# Patient Record
Sex: FEMALE | Race: WHITE
[De-identification: names, ages, dates, MRNs, and addresses within clinical notes are randomized per-mention and may not be internally consistent; named-entity substitution may affect disease eponyms.]

---

## 2017-01-13 ENCOUNTER — HOSPITAL ENCOUNTER (OUTPATIENT)
Dept: HOSPITAL 47 - ORWHC2ENDO | Age: 57
Discharge: HOME | End: 2017-01-13
Attending: SURGERY
Payer: MEDICARE

## 2017-01-13 VITALS — RESPIRATION RATE: 20 BRPM | SYSTOLIC BLOOD PRESSURE: 126 MMHG | DIASTOLIC BLOOD PRESSURE: 84 MMHG | HEART RATE: 84 BPM

## 2017-01-13 VITALS — TEMPERATURE: 98 F

## 2017-01-13 VITALS — BODY MASS INDEX: 40.2 KG/M2

## 2017-01-13 DIAGNOSIS — E66.01: ICD-10-CM

## 2017-01-13 DIAGNOSIS — F41.9: ICD-10-CM

## 2017-01-13 DIAGNOSIS — F31.30: ICD-10-CM

## 2017-01-13 DIAGNOSIS — Z79.899: ICD-10-CM

## 2017-01-13 DIAGNOSIS — Z88.1: ICD-10-CM

## 2017-01-13 DIAGNOSIS — Z88.8: ICD-10-CM

## 2017-01-13 DIAGNOSIS — Z91.041: ICD-10-CM

## 2017-01-13 DIAGNOSIS — K21.9: ICD-10-CM

## 2017-01-13 DIAGNOSIS — K29.50: Primary | ICD-10-CM

## 2017-01-13 PROCEDURE — 88342 IMHCHEM/IMCYTCHM 1ST ANTB: CPT

## 2017-01-13 PROCEDURE — 99153 MOD SED SAME PHYS/QHP EA: CPT

## 2017-01-13 PROCEDURE — 43239 EGD BIOPSY SINGLE/MULTIPLE: CPT

## 2017-01-13 PROCEDURE — 88305 TISSUE EXAM BY PATHOLOGIST: CPT

## 2017-01-13 NOTE — P.GSHP
History of Present Illness


H&P Date: 01/13/17


Chief Complaint: GERD, morbid obesity





This a 56-year-old female who presents today for EGD.  Patient is undergoing 

workup for sleeve gastrectomy.  She has a history of GERD and morbid obesity.  

Her BMI is 40.





Past Medical History


Past Medical History: Asthma, Fibromyalgia, GERD/Reflux, Musculoskeletal 

Disorder, Osteoarthritis (OA), Skin Disorder, Thyroid Disorder


Additional Past Medical History / Comment(s): IBS, migraines,  respiratory 

infection - on antibiotics, hiatal hernia,  hx gout,  rosacea,


History of Any Multi-Drug Resistant Organisms: None Reported


Past Surgical History: Adenoidectomy, Appendectomy, Bariatric Surgery, 

Cholecystectomy, Heart Catheterization, Orthopedic Surgery, Tonsillectomy, 

Tubal Ligation, Uterine Ablation


Additional Past Surgical History / Comment(s): tendon/ligament repair in foot, 

fredo knee arthroscopy, lipoma out of rt. shoulder, lap band/later  removed, 

thyroidectomy, rt hand surgery,


Past Anesthesia/Blood Transfusion Reactions: Motion Sickness


Past Psychological History: Anxiety, Bipolar, Depression


Additional Psychological History / Comment(s): Major depressive disorder,  

suicide attempts x 4,


Smoking Status: Never smoker


Past Alcohol Use History: None Reported


Past Drug Use History: None Reported





- Past Family History


  ** Mother


Family Medical History: Cancer


Additional Family Medical History / Comment(s): Pancreatic





  ** Father


Family Medical History: Cancer, Deep Vein Thrombosis (DVT), Pulmonary Embolus


Additional Family Medical History / Comment(s): Prostate





Medications and Allergies


 Home Medications











 Medication  Instructions  Recorded  Confirmed  Type


 


Lansoprazole [Prevacid] 30 mg PO DAILY 06/05/16 01/13/17 History


 


Levothyroxine Sodium [Synthroid] 25 mcg PO DAILY 06/05/16 01/13/17 History


 


Sertraline [Zoloft] 100 mg PO HS 06/05/16 01/13/17 History


 


carBAMazepine [TEGretol] 200 mg PO HS 06/05/16 01/13/17 History


 


traZODone HCL [Desyrel] 200 mg PO HS 06/05/16 01/13/17 History


 


Baclofen [Lioresal] 5 mg PO BID PRN 07/07/16 01/13/17 History


 


Calcium Tablet 1,000 mg PO QAM 08/08/16 01/13/17 History


 


Multivitamins, Thera [Multivitamin] 2 tab PO DAILY 08/08/16 01/13/17 History


 


Temazepam [Restoril] 30 mg PO HS PRN 08/08/16 01/13/17 History


 


traMADol HCL [Ultram] 50 mg PO Q8HR PRN 08/08/16 01/13/17 History


 


Ibuprofen [Motrin] 800 mg PO Q8HR PRN 10/20/16 01/13/17 History


 


Albuterol Inhaler [Ventolin Hfa 2 puff INHALATION Q6HR PRN 01/10/17 01/13/17 

History





Inhaler]    


 


Doxycycline Monohydrate [Monodox] 100 mg PO Q12HR 01/10/17 01/13/17 History


 


Loratadine [Claritin] 10 mg PO DAILY PRN 01/10/17 01/13/17 History











 Allergies











Allergy/AdvReac Type Severity Reaction Status Date / Time


 


desvenlafaxine succinate Allergy  SORES IN Verified 01/13/17 09:09





[From Pristiq]   MOUTH  


 


cefuroxime axetil AdvReac  Nausea & Verified 01/13/17 09:09





[From Ceftin]   Vomiting  


 


etodolac [From Lodine] AdvReac  HEART BURN Verified 01/13/17 09:09


 


nitrofurantoin AdvReac  Nausea & Verified 01/13/17 09:09





macrocrystalline   Vomiting  





[From Macrodantin]     














Surgical - Exam


 Vital Signs











Temp Pulse Resp BP Pulse Ox


 


 98 F   81   16   117/59   98 


 


 01/13/17 09:15  01/13/17 09:15  01/13/17 09:15  01/13/17 09:15  01/13/17 09:15














- General


well developed, no distress





- Eyes


PERRL





- ENT


normal pinna





- Neck


no masses, no bruits





- Respiratory


normal expansion





- Cardiovascular


Rhythm: regular





- Abdomen


Abdomen: soft, non tender





Assessment and Plan


Plan: 











Morbid obesity





We'll perform EGD

## 2017-01-13 NOTE — P.OP
Date of Procedure: 01/13/17


Preoperative Diagnosis: 


GERD





Morbid obesity


Postoperative Diagnosis: 


Mild antral gastritis


Procedure(s) Performed: 


EGD


Anesthesia: MAC


Surgeon: Danish Townsend


Pathology: other (Antrum)


Condition: stable


Disposition: PACU


Description of Procedure: 


The patient's placed on the endoscopy table at the lateral position.  The 

gastric scope some placed oropharynx and passed in the esophagus and into the 

stomach.  The scope was then placed through the pylorus.  The first and second 

portion of the duodenum appeared normal.  The scope was then brought back the 

antrum this.  Mildly inflamed.  A biopsies performed.  Scope was unretroflexed 

and remainder of the stomach appeared normal.  There was no significant hiatal 

hernia.  The GE junction was at 47.  The distal esophagus appeared normal.  The 

proximal esophagus.  Normal.  Scope was withdrawn for patient.

## 2017-02-10 ENCOUNTER — HOSPITAL ENCOUNTER (INPATIENT)
Dept: HOSPITAL 47 - 2ORWHC | Age: 57
LOS: 4 days | Discharge: HOME | DRG: 620 | End: 2017-02-14
Attending: SURGERY | Admitting: SURGERY
Payer: MEDICARE

## 2017-02-10 VITALS — BODY MASS INDEX: 38.9 KG/M2

## 2017-02-10 DIAGNOSIS — M79.7: ICD-10-CM

## 2017-02-10 DIAGNOSIS — K92.0: ICD-10-CM

## 2017-02-10 DIAGNOSIS — G40.909: ICD-10-CM

## 2017-02-10 DIAGNOSIS — E66.01: Primary | ICD-10-CM

## 2017-02-10 DIAGNOSIS — J45.909: ICD-10-CM

## 2017-02-10 DIAGNOSIS — M10.9: ICD-10-CM

## 2017-02-10 DIAGNOSIS — E03.9: ICD-10-CM

## 2017-02-10 DIAGNOSIS — Z88.1: ICD-10-CM

## 2017-02-10 DIAGNOSIS — Z88.3: ICD-10-CM

## 2017-02-10 DIAGNOSIS — K21.9: ICD-10-CM

## 2017-02-10 DIAGNOSIS — K31.1: ICD-10-CM

## 2017-02-10 DIAGNOSIS — Z79.899: ICD-10-CM

## 2017-02-10 DIAGNOSIS — G89.4: ICD-10-CM

## 2017-02-10 DIAGNOSIS — F41.9: ICD-10-CM

## 2017-02-10 DIAGNOSIS — F32.9: ICD-10-CM

## 2017-02-10 DIAGNOSIS — G43.909: ICD-10-CM

## 2017-02-10 DIAGNOSIS — K58.9: ICD-10-CM

## 2017-02-10 DIAGNOSIS — Z88.2: ICD-10-CM

## 2017-02-10 DIAGNOSIS — K66.0: ICD-10-CM

## 2017-02-10 PROCEDURE — 82565 ASSAY OF CREATININE: CPT

## 2017-02-10 PROCEDURE — 0DB64Z3 EXCISION OF STOMACH, PERCUTANEOUS ENDOSCOPIC APPROACH, VERTICAL: ICD-10-PCS

## 2017-02-10 PROCEDURE — 84100 ASSAY OF PHOSPHORUS: CPT

## 2017-02-10 PROCEDURE — 80051 ELECTROLYTE PANEL: CPT

## 2017-02-10 PROCEDURE — 83735 ASSAY OF MAGNESIUM: CPT

## 2017-02-10 PROCEDURE — 88307 TISSUE EXAM BY PATHOLOGIST: CPT

## 2017-02-10 PROCEDURE — 94760 N-INVAS EAR/PLS OXIMETRY 1: CPT

## 2017-02-10 PROCEDURE — 0DN64ZZ RELEASE STOMACH, PERCUTANEOUS ENDOSCOPIC APPROACH: ICD-10-PCS

## 2017-02-10 PROCEDURE — 85025 COMPLETE CBC W/AUTO DIFF WBC: CPT

## 2017-02-10 PROCEDURE — 94640 AIRWAY INHALATION TREATMENT: CPT

## 2017-02-10 PROCEDURE — 82310 ASSAY OF CALCIUM: CPT

## 2017-02-10 PROCEDURE — 84520 ASSAY OF UREA NITROGEN: CPT

## 2017-02-10 PROCEDURE — 85027 COMPLETE CBC AUTOMATED: CPT

## 2017-02-10 PROCEDURE — 80048 BASIC METABOLIC PNL TOTAL CA: CPT

## 2017-02-10 PROCEDURE — 74240 X-RAY XM UPR GI TRC 1CNTRST: CPT

## 2017-02-10 PROCEDURE — 0FN04ZZ RELEASE LIVER, PERCUTANEOUS ENDOSCOPIC APPROACH: ICD-10-PCS

## 2017-02-10 RX ADMIN — ISODIUM CHLORIDE SCH MG: 0.03 SOLUTION RESPIRATORY (INHALATION) at 20:29

## 2017-02-10 RX ADMIN — ISODIUM CHLORIDE SCH: 0.03 SOLUTION RESPIRATORY (INHALATION) at 16:57

## 2017-02-10 RX ADMIN — KETOROLAC TROMETHAMINE SCH MG: 30 INJECTION, SOLUTION INTRAMUSCULAR at 18:37

## 2017-02-10 RX ADMIN — ONDANSETRON PRN MG: 2 INJECTION INTRAMUSCULAR; INTRAVENOUS at 22:01

## 2017-02-10 RX ADMIN — KETOROLAC TROMETHAMINE SCH MG: 30 INJECTION, SOLUTION INTRAMUSCULAR at 23:40

## 2017-02-10 RX ADMIN — POTASSIUM CHLORIDE SCH MLS: 14.9 INJECTION, SOLUTION INTRAVENOUS at 13:01

## 2017-02-10 RX ADMIN — THIAMINE HYDROCHLORIDE SCH MLS/HR: 100 INJECTION, SOLUTION INTRAMUSCULAR; INTRAVENOUS at 22:01

## 2017-02-10 NOTE — P.OP
Date of Procedure: 02/10/17


Preoperative Diagnosis: 


Morbid obesity


Postoperative Diagnosis: 


Morbid obesity


Procedure(s) Performed: 


Laparoscopic sleeve gastrectomy





Lysis of adhesions


Anesthesia: ANGELES


Surgeon: Danish Townsend


Estimated Blood Loss (ml): 15


Pathology: other (Stomach)


Condition: stable


Disposition: PACU


Description of Procedure: 


The patient was placed on the operating room table in the supine position.  She 

received general anesthesia and then was placed in dorsal lithotomy position.  

Her abdomen was prepped and draped in sterile fashion.  The skin incision sites 

were anesthetized 1% local Xylocaine.  And then the skin was incised with an 11 

blade in the left lateral position.  Using a blade less trocar under direct 

visualization the peritoneal cavity was entered.  The abdomen was insufflated 

and then a 5 mm laparoscope was placed into the peritoneal cavity.  A 5 mm 

trocar was placed in the right epigastric, and right lateral position.  A 15 mm 

trocar was placed in the supra-umbilical position and another 5 mm trocar was 

placed in the left lateral position.  The left lateral lobe of the liver was 

retracted.  The stomach was visualized. 





There were extensive adhesions between the stomach and liver and anterior 

abdominal wall.  These were lysed using sharp dissection.  Prostate 15 minutes 

of operative time used to lyse adhesions.





 The greater curvature of the stomach was then dissected using the Harmonic 

scissors.  The dissection occurred approximately 5 cm from the pylorus to the 

level of the left avelino.  There was no hiatal hernia seen.  At this point a 40-

Vietnamese bougie dilator was placed the oropharynx and passed into the esophagus 

and into the stomach by the CRNA.  The sleeve gastrectomy was performed by 

using the powered echelon stapler with a seam guard buttress material.  

Sequential firings of the stapler were performed.  The gastric remnant was then 

brought out through the 15 mm trocar site.  The dilator was withdrawn.  And a 

orogastric tube was replaced into the stomach.  The stomach was insufflated 

with 200 mL of methylene blue normal saline.  There was no evidence of 

extravasation.  The abdomen was irrigated there is no bleeding seen.  The Mata

-Alisha device was used to close the 15 mm trocar with 0 Vicryl.  Skin was 

closed with interrupted 3-0 Monocryl sutures once the trochars withdrawn.  

Dermabond dressing was applied.  Patient was sent to recovery in stable 

condition.demetrice

## 2017-02-10 NOTE — CONS
DATE OF CONSULTATION:  02/10/2017 



CHIEF COMPLAINT: A 56-year-old white female, status post gastric 

sleeve for medical management. 



CONSULT: This is a 56-year-old white female with history of asthma, 

anxiety, depression, allergic rhinitis, seizure disorder, chronic pain 

syndrome, possibly hypothyroidism, GERD, chronic lumbar disc disease, 

status post gastric sleeve. She is having upper gas pressure 

consistent with surgery. No chest pain. She had a previous lap band. 

She has no chest pain at this time.  



PSYCH: She appears flat affect on speaking. 



PAST MEDICAL HISTORY: Asthma, fibromyalgia, GERD, osteoarthritis, skin 

disorder, hypothyroidism, irritable bowel syndrome, minor hiatal 

hernia, rosacea, lumbar degenerative disc disease.  



SURGERY: Adenoidectomy, appendectomy, bariatric surgery, 

cholecystectomy, heart catheterization, orthopedic surgery, 

tonsillectomy, tubal ligation and uterine ablation, ligament repair 

and bilateral knee arthroscopy, lipoma on the right shoulder, lap band 

insertion removal, right hand surgery.   



Has anxiety, bipolar, depression. Suicide attempts x4. 



SOCIAL HISTORY: No smoking. No alcohol. No illicit drugs. Lives with 

her  of 21 years in [place].  



FAMILY HISTORY: Mother with cancer. Father with cancer, DVT, pulmonary 

embolism.  



HOME MEDICATIONS: 

1. Prevacid 30 daily. 

2. Levothyroxine 25 mcg daily. 

3. Zoloft 10 mg q.h.s.

4. Tegretol 200 q.h.s. 

5. Desyrel 200 q.h.s. 

6. Lioresal 5 mg b.i.d. 

7. Multivitamin 2 tabs daily. 

8. Restoril 30 q.h.s.

9. Tramadol 50 q.8 hours. 

10. Motrin 800 q.8 hours. 

11. Ventolin HFA 2 puffs q.6 hours p.r.n.

12. Claritin 10 mg daily. 

13. Calcium carbonate 1500 mg daily. 



ALLERGIES: PRISTIQ, LODINE, BACTRIM, CEFUROXIME. 



Temp 97.9, pulse 85, respirations 16 to 18, blood pressure is 120/90s, 

O2 93%. Well developed, well nourished white female.  

PSYCH: Appears flat affects, sleepy, lethargic. 

NEUROLOGIC: Alert and oriented x3. 

CARDIOVASCULAR: Regular rate and rhythm. 

LUNGS: Clear. 

ABDOMEN: Soft, nontender. 



ASSESSMENT: 

1. Morbid obesity, status post gastric sleeve. 

2. Hypothyroidism. 

3. Anxiety, depression. 

4. Obesity. 

5. Fibromyalgia. 

6. Migraines. 

7. Irritable bowel syndrome. 



Continue home medications. Monitor her breathing. Reorder her home 

medicines after she passes her swallow evaluation. Thank you Dr. Townsend.

## 2017-02-10 NOTE — P.GSHP
History of Present Illness


H&P Date: 02/10/17


Chief Complaint: Morbid obesity





This a 56-year-old female who presents today for laparoscopic sleeve 

gastrectomy.  Patient is history of obesity with severe coronary disease.  She'

s had a previous LAP-BAND procedure.  Patient aware the risks of surgery 

including conversion to open procedure.  Injury to the stomach liver or spleen.

  She is also aware the risk staple line disruption, bleeding or perforation.





- Constitutional


Constitutional: Reports as per HPI





Past Medical History


Past Medical History: Asthma, Fibromyalgia, GERD/Reflux, Musculoskeletal 

Disorder, Osteoarthritis (OA), Skin Disorder, Thyroid Disorder


Additional Past Medical History / Comment(s): IBS, MIGRAINES, HIATAL HERNIA, 

GOUT, ROSACEA, HERNIATED DISCS WITH BACK PAIN - [facility] PROCEDURES, LOW 

THYROID, COUGH DUE TO SINUS DRAINAGE .


History of Any Multi-Drug Resistant Organisms: None Reported


Past Surgical History: Adenoidectomy, Appendectomy, Bariatric Surgery, 

Cholecystectomy, Heart Catheterization, Orthopedic Surgery, Tonsillectomy, 

Tubal Ligation, Uterine Ablation


Additional Past Surgical History / Comment(s): tendon/ligament repair in foot, 

fredo knee arthroscopy, lipoma out of rt. shoulder, lap band insertion and  

removed, rt hand surgery,.


Past Anesthesia/Blood Transfusion Reactions: Motion Sickness


Past Psychological History: Anxiety, Bipolar, Depression


Additional Psychological History / Comment(s): Major depressive disorder,  

suicide attempts x 4,


Smoking Status: Never smoker


Past Alcohol Use History: None Reported


Past Drug Use History: None Reported





- Past Family History


  ** Mother


Family Medical History: Cancer


Additional Family Medical History / Comment(s): Pancreatic cancer





  ** Father


Family Medical History: Cancer, Deep Vein Thrombosis (DVT), Pulmonary Embolus


Additional Family Medical History / Comment(s): Prostate  cancer





Medications and Allergies


 Home Medications











 Medication  Instructions  Recorded  Confirmed  Type


 


Lansoprazole [Prevacid] 30 mg PO DAILY 06/05/16 02/10/17 History


 


Levothyroxine Sodium [Synthroid] 25 mcg PO [id number] 06/05/16 02/10/17 History


 


Sertraline [Zoloft] 100 mg PO HS 06/05/16 02/10/17 History


 


carBAMazepine [TEGretol] 200 mg PO HS 06/05/16 02/10/17 History


 


traZODone HCL [Desyrel] 200 mg PO HS 06/05/16 02/10/17 History


 


Baclofen [Lioresal] 5 mg PO BID PRN 07/07/16 02/10/17 History


 


Multivitamins, Thera [Multivitamin] 2 tab PO DAILY 08/08/16 02/10/17 History


 


Temazepam [Restoril] 30 mg PO HS PRN 08/08/16 02/10/17 History


 


traMADol HCL [Ultram] 50 mg PO Q8HR PRN 08/08/16 02/10/17 History


 


Ibuprofen [Motrin] 800 mg PO Q8HR PRN 10/20/16 02/10/17 History


 


Albuterol Inhaler [Ventolin Hfa 2 puff INHALATION Q6HR PRN 01/10/17 02/10/17 

History





Inhaler]    


 


Loratadine [Claritin] 10 mg PO DAILY PRN 01/10/17 02/10/17 History


 


Calcium Carbonate [Calcium] 1,500 mg PO DAILY 01/30/17 02/10/17 History











 Allergies











Allergy/AdvReac Type Severity Reaction Status Date / Time


 


desvenlafaxine succinate Allergy  SORES IN Verified 02/10/17 12:52





[From Pristiq]   MOUTH  


 


cefuroxime axetil AdvReac  Nausea & Verified 02/10/17 12:52





[From Ceftin]   Vomiting  


 


etodolac [From Lodine] AdvReac  HEART BURN Verified 02/10/17 12:52


 


nitrofurantoin AdvReac  Nausea & Verified 02/10/17 12:52





macrocrystalline   Vomiting  





[From Macrodantin]     














Surgical - Exam


 Vital Signs











Temp Pulse Resp BP Pulse Ox


 


 97.9 F   85   16   129/93   93 L


 


 02/10/17 12:59  02/10/17 12:59  02/10/17 12:59  02/10/17 12:59  02/10/17 12:59














- General


well developed, no distress





- Eyes


PERRL





- ENT


normal pinna





- Neck


no masses





- Respiratory


normal expansion





- Cardiovascular


Rhythm: regular





- Abdomen


Abdomen: soft, non tender





Assessment and Plan


Plan: 





Morbid obesity BMI 41.  She'll undergo laparoscopic sleeve gastrectomy today.

## 2017-02-11 VITALS — RESPIRATION RATE: 16 BRPM

## 2017-02-11 LAB
ANION GAP SERPL CALC-SCNC: 17 MMOL/L
BASOPHILS # BLD AUTO: 0.1 K/UL (ref 0–0.2)
BASOPHILS NFR BLD AUTO: 0 %
BUN SERPL-SCNC: 6 MG/DL (ref 7–17)
CALCIUM SPEC-MCNC: 8.8 MG/DL (ref 8.4–10.2)
CH: 27.7
CHCM: 31.1
CHLORIDE SERPL-SCNC: 107 MMOL/L (ref 98–107)
CO2 SERPL-SCNC: 15 MMOL/L (ref 22–30)
EOSINOPHIL # BLD AUTO: 0 K/UL (ref 0–0.7)
EOSINOPHIL NFR BLD AUTO: 0 %
ERYTHROCYTE [DISTWIDTH] IN BLOOD BY AUTOMATED COUNT: 4.93 M/UL (ref 3.8–5.4)
ERYTHROCYTE [DISTWIDTH] IN BLOOD: 13.6 % (ref 11.5–15.5)
HCT VFR BLD AUTO: 44.1 % (ref 34–46)
HDW: 2.7
HGB BLD-MCNC: 13.8 GM/DL (ref 11.4–16)
LUC NFR BLD AUTO: 1 %
LYMPHOCYTES # SPEC AUTO: 1.1 K/UL (ref 1–4.8)
LYMPHOCYTES NFR SPEC AUTO: 7 %
MAGNESIUM SPEC-SCNC: 1.7 MG/DL (ref 1.6–2.3)
MCH RBC QN AUTO: 28 PG (ref 25–35)
MCHC RBC AUTO-ENTMCNC: 31.3 G/DL (ref 31–37)
MCV RBC AUTO: 89.5 FL (ref 80–100)
MONOCYTES # BLD AUTO: 0.7 K/UL (ref 0–1)
MONOCYTES NFR BLD AUTO: 5 %
NEUTROPHILS # BLD AUTO: 13.8 K/UL (ref 1.3–7.7)
NEUTROPHILS NFR BLD AUTO: 87 %
NON-AFRICAN AMERICAN GFR(MDRD): >60
PHOSPHATE SERPL-MCNC: 2.3 MG/DL (ref 2.5–4.5)
POTASSIUM SERPL-SCNC: 4.9 MMOL/L (ref 3.5–5.1)
SODIUM SERPL-SCNC: 139 MMOL/L (ref 137–145)
WBC # BLD AUTO: 0.11 10*3/UL
WBC # BLD AUTO: 15.8 K/UL (ref 3.8–10.6)
WBC (PEROX): 16.37

## 2017-02-11 RX ADMIN — ONDANSETRON PRN MG: 2 INJECTION INTRAMUSCULAR; INTRAVENOUS at 23:46

## 2017-02-11 RX ADMIN — ENOXAPARIN SODIUM SCH MG: 40 INJECTION SUBCUTANEOUS at 20:41

## 2017-02-11 RX ADMIN — METOCLOPRAMIDE PRN MG: 5 INJECTION, SOLUTION INTRAMUSCULAR; INTRAVENOUS at 19:15

## 2017-02-11 RX ADMIN — KETOROLAC TROMETHAMINE SCH MG: 30 INJECTION, SOLUTION INTRAMUSCULAR at 23:14

## 2017-02-11 RX ADMIN — POTASSIUM CHLORIDE SCH: 14.9 INJECTION, SOLUTION INTRAVENOUS at 03:56

## 2017-02-11 RX ADMIN — ONDANSETRON PRN MG: 2 INJECTION INTRAMUSCULAR; INTRAVENOUS at 19:54

## 2017-02-11 RX ADMIN — THIAMINE HYDROCHLORIDE SCH: 100 INJECTION, SOLUTION INTRAMUSCULAR; INTRAVENOUS at 03:12

## 2017-02-11 RX ADMIN — POTASSIUM CHLORIDE SCH MLS/HR: 14.9 INJECTION, SOLUTION INTRAVENOUS at 14:13

## 2017-02-11 RX ADMIN — ONDANSETRON PRN MG: 2 INJECTION INTRAMUSCULAR; INTRAVENOUS at 05:15

## 2017-02-11 RX ADMIN — KETOROLAC TROMETHAMINE SCH MG: 30 INJECTION, SOLUTION INTRAMUSCULAR at 05:40

## 2017-02-11 RX ADMIN — KETOROLAC TROMETHAMINE SCH MG: 30 INJECTION, SOLUTION INTRAMUSCULAR at 14:13

## 2017-02-11 RX ADMIN — THIAMINE HYDROCHLORIDE SCH MLS/HR: 100 INJECTION, SOLUTION INTRAMUSCULAR; INTRAVENOUS at 05:32

## 2017-02-11 RX ADMIN — POTASSIUM CHLORIDE SCH: 14.9 INJECTION, SOLUTION INTRAVENOUS at 20:44

## 2017-02-11 RX ADMIN — ISODIUM CHLORIDE SCH: 0.03 SOLUTION RESPIRATORY (INHALATION) at 11:53

## 2017-02-11 RX ADMIN — POTASSIUM CHLORIDE SCH MLS/HR: 14.9 INJECTION, SOLUTION INTRAVENOUS at 20:19

## 2017-02-11 RX ADMIN — LEVOTHYROXINE SODIUM ANHYDROUS SCH MCG: 100 INJECTION, POWDER, LYOPHILIZED, FOR SOLUTION INTRAVENOUS at 14:13

## 2017-02-11 RX ADMIN — METOCLOPRAMIDE PRN MG: 5 INJECTION, SOLUTION INTRAMUSCULAR; INTRAVENOUS at 07:08

## 2017-02-11 RX ADMIN — PANTOPRAZOLE SODIUM SCH: 40 INJECTION, POWDER, FOR SOLUTION INTRAVENOUS at 14:04

## 2017-02-11 RX ADMIN — ISODIUM CHLORIDE SCH: 0.03 SOLUTION RESPIRATORY (INHALATION) at 07:37

## 2017-02-11 RX ADMIN — KETOROLAC TROMETHAMINE SCH MG: 30 INJECTION, SOLUTION INTRAMUSCULAR at 19:15

## 2017-02-11 RX ADMIN — ENOXAPARIN SODIUM SCH MG: 40 INJECTION SUBCUTANEOUS at 14:12

## 2017-02-11 RX ADMIN — ISODIUM CHLORIDE SCH: 0.03 SOLUTION RESPIRATORY (INHALATION) at 16:00

## 2017-02-11 NOTE — P.PN
Subjective


Principal diagnosis: 





Status post gastric sleeve resection





The patient's postoperative day 1 from a gastric sleeve resection.  She had 

some persistent nausea and vomiting through the night.  Had some small amounts 

of bloody emesis.  He underwent the upper GI this morning which showed complete 

obstruction proximally.





Objective





- Vital Signs


Vital signs: 


 Vital Signs











Temp  97.6 F   02/11/17 07:00


 


Pulse  76   02/11/17 07:00


 


Resp  17   02/11/17 07:00


 


BP  126/63   02/11/17 07:00


 


Pulse Ox  99   02/11/17 07:00








 Intake & Output











 02/10/17 02/11/17 02/11/17





 18:59 06:59 18:59


 


Intake Total 1364.5 1350 


 


Output Total 15  


 


Balance 1349.5 1350 


 


Weight 96.615 kg  


 


Intake:   


 


  IV 1364.5 1350 


 


    0.9% NaCl with KCl 20 Meq  1350 





    /l 1,000 ml @ 150 mls/hr   





    IV .Q6H40M LUCINDA Rx#:   





    [id number]   


 


Output:   


 


  Estimated Blood Loss 15  


 


Other:   


 


  Voiding Method  Toilet Toilet


 


  # Voids 1 3 














- Constitutional


General appearance: Present: cooperative, no acute distress





- Respiratory


Respiratory: bilateral: CTA





- Gastrointestinal


General gastrointestinal: Present: decreased bowel sounds, soft


Localized gastrointestinal: surgical scar: diffuse (Incisions have some 

ecchymosis.)





- Labs


CBC & Chem 7: 


 02/11/17 07:04





 02/11/17 07:04


Labs: 


 Abnormal Lab Results - Last 24 Hours (Table)











  02/11/17 02/11/17 Range/Units





  07:04 07:04 


 


WBC  15.8 H   (3.8-10.6)  k/uL


 


Neutrophils #  13.8 H   (1.3-7.7)  k/uL


 


Carbon Dioxide   15 L  (22-30)  mmol/L


 


BUN   6 L  (7-17)  mg/dL


 


Phosphorus   2.3 L  (2.5-4.5)  mg/dL














Assessment and Plan


(1) Gastric outflow obstruction


Status: Acute   





(2) GERD (gastroesophageal reflux disease)


Status: Acute   


Plan: 





The patient has a gastric obstruction likely due to edema and/or hematoma along 

her staple line.  We'll start IV fluids.  Make her absolute NPO including 

medications.  Continue IV proton pump inhibitor.  Switch over to IV 

medications.  This will likely resolve in the next 48-72 hours.  This was 

explained to her.  Questions were encouraged and answered.

## 2017-02-11 NOTE — PN
SUBJECTIVE: 56-year-old white female with morbid obesity. She is 

having severe nausea and vomiting last night. She had thrown up 4 

times, a bile-type fluid in the past 24 hours. She is going to stay 

for the weekend. Tegretol due to her vomiting will be switched from 

p.o. over to IV, pharmacy will switch this. Discussed this with the 

nurse. White count is 15.8, hemoglobin 13.8, BUN 6, creatinine 0.56, 

phosphorus 2.3.  



PLAN: Continue updrafts. Lovenox and Synthroid will be given IVP. GERD 

prophylaxis will be given IV. Ativan will be given for anxiety, 1 to 2 

mg to control her seizures also at this time, until she can resume her 

oral antiseizure medicines. Follow up in next 24 to 48 hours.

## 2017-02-12 LAB
ANION GAP SERPL CALC-SCNC: 14 MMOL/L
BUN SERPL-SCNC: 3 MG/DL (ref 7–17)
CALCIUM SPEC-MCNC: 9.1 MG/DL (ref 8.4–10.2)
CH: 28.1
CHCM: 32.5
CHLORIDE SERPL-SCNC: 109 MMOL/L (ref 98–107)
CO2 SERPL-SCNC: 19 MMOL/L (ref 22–30)
ERYTHROCYTE [DISTWIDTH] IN BLOOD BY AUTOMATED COUNT: 4.87 M/UL (ref 3.8–5.4)
ERYTHROCYTE [DISTWIDTH] IN BLOOD: 13.8 % (ref 11.5–15.5)
GLUCOSE SERPL-MCNC: 123 MG/DL (ref 74–99)
HCT VFR BLD AUTO: 42.4 % (ref 34–46)
HDW: 2.83
HGB BLD-MCNC: 13.4 GM/DL (ref 11.4–16)
MCH RBC QN AUTO: 27.5 PG (ref 25–35)
MCHC RBC AUTO-ENTMCNC: 31.6 G/DL (ref 31–37)
MCV RBC AUTO: 87.2 FL (ref 80–100)
NON-AFRICAN AMERICAN GFR(MDRD): >60
POTASSIUM SERPL-SCNC: 3.9 MMOL/L (ref 3.5–5.1)
SODIUM SERPL-SCNC: 142 MMOL/L (ref 137–145)
WBC # BLD AUTO: 11.1 K/UL (ref 3.8–10.6)

## 2017-02-12 RX ADMIN — ISODIUM CHLORIDE SCH: 0.03 SOLUTION RESPIRATORY (INHALATION) at 19:26

## 2017-02-12 RX ADMIN — ENOXAPARIN SODIUM SCH MG: 40 INJECTION SUBCUTANEOUS at 09:12

## 2017-02-12 RX ADMIN — ISODIUM CHLORIDE SCH: 0.03 SOLUTION RESPIRATORY (INHALATION) at 18:24

## 2017-02-12 RX ADMIN — POTASSIUM CHLORIDE SCH MLS/HR: 14.9 INJECTION, SOLUTION INTRAVENOUS at 18:25

## 2017-02-12 RX ADMIN — METOCLOPRAMIDE PRN MG: 5 INJECTION, SOLUTION INTRAMUSCULAR; INTRAVENOUS at 00:35

## 2017-02-12 RX ADMIN — METOCLOPRAMIDE PRN MG: 5 INJECTION, SOLUTION INTRAMUSCULAR; INTRAVENOUS at 18:25

## 2017-02-12 RX ADMIN — ISODIUM CHLORIDE SCH: 0.03 SOLUTION RESPIRATORY (INHALATION) at 08:17

## 2017-02-12 RX ADMIN — KETOROLAC TROMETHAMINE SCH MG: 30 INJECTION, SOLUTION INTRAMUSCULAR at 11:15

## 2017-02-12 RX ADMIN — POTASSIUM CHLORIDE SCH MLS/HR: 14.9 INJECTION, SOLUTION INTRAVENOUS at 20:16

## 2017-02-12 RX ADMIN — LEVOTHYROXINE SODIUM ANHYDROUS SCH MCG: 100 INJECTION, POWDER, LYOPHILIZED, FOR SOLUTION INTRAVENOUS at 09:11

## 2017-02-12 RX ADMIN — ISODIUM CHLORIDE SCH: 0.03 SOLUTION RESPIRATORY (INHALATION) at 13:27

## 2017-02-12 RX ADMIN — ONDANSETRON PRN MG: 2 INJECTION INTRAMUSCULAR; INTRAVENOUS at 04:08

## 2017-02-12 RX ADMIN — POTASSIUM CHLORIDE SCH MLS/HR: 14.9 INJECTION, SOLUTION INTRAVENOUS at 11:19

## 2017-02-12 RX ADMIN — KETOROLAC TROMETHAMINE SCH MG: 30 INJECTION, SOLUTION INTRAMUSCULAR at 05:34

## 2017-02-12 RX ADMIN — ISODIUM CHLORIDE SCH: 0.03 SOLUTION RESPIRATORY (INHALATION) at 00:01

## 2017-02-12 RX ADMIN — METOCLOPRAMIDE PRN MG: 5 INJECTION, SOLUTION INTRAMUSCULAR; INTRAVENOUS at 11:16

## 2017-02-12 RX ADMIN — PANTOPRAZOLE SODIUM SCH MG: 40 INJECTION, POWDER, FOR SOLUTION INTRAVENOUS at 09:12

## 2017-02-12 RX ADMIN — ENOXAPARIN SODIUM SCH MG: 40 INJECTION SUBCUTANEOUS at 20:16

## 2017-02-12 RX ADMIN — METOCLOPRAMIDE PRN MG: 5 INJECTION, SOLUTION INTRAMUSCULAR; INTRAVENOUS at 05:35

## 2017-02-12 RX ADMIN — POTASSIUM CHLORIDE SCH: 14.9 INJECTION, SOLUTION INTRAVENOUS at 20:14

## 2017-02-12 NOTE — P.PN
Subjective


Principal diagnosis: 





Status post gastric sleeve resection





The patient has stopped having dry heaves.  She is having heartburn however.  

This feeling a little better today.





Objective





- Vital Signs


Vital signs: 


 Vital Signs











Temp  97.9 F   02/12/17 07:00


 


Pulse  57 L  02/12/17 07:00


 


Resp  16   02/12/17 07:00


 


BP  147/71   02/12/17 07:00


 


Pulse Ox  96   02/12/17 07:00








 Intake & Output











 02/11/17 02/12/17 02/12/17





 18:59 06:59 18:59


 


Other:   


 


  Voiding Method Toilet Toilet 


 


  # Voids 1 1 














- Constitutional


General appearance: Present: cooperative, no acute distress





- Gastrointestinal


General gastrointestinal: Present: soft


Localized gastrointestinal: surgical scar: diffuse (Incisions healing with some 

ecchymosis)





- Labs


CBC & Chem 7: 


 02/12/17 09:44





 02/12/17 09:44


Labs: 


 Abnormal Lab Results - Last 24 Hours (Table)











  02/12/17 02/12/17 Range/Units





  09:44 09:44 


 


WBC  11.1 H   (3.8-10.6)  k/uL


 


Chloride   109 H  ()  mmol/L


 


Carbon Dioxide   19 L  (22-30)  mmol/L


 


BUN   3 L  (7-17)  mg/dL


 


Glucose   123 H  (74-99)  mg/dL














Assessment and Plan


(1) Gastric outflow obstruction


Status: Acute   





(2) GERD (gastroesophageal reflux disease)


Status: Acute   


Plan: 





Continue nothing by mouth.  Continue hydration.  May try to reinitiate ice 

cubes and sips of liquid stool tomorrow.

## 2017-02-12 NOTE — FL
EXAMINATION TYPE: FL UGI w esophagus

 

DATE OF EXAM: 2/12/2017 8:39 AM

 

COMPARISON: 2/11/2017

 

HISTORY: Post gastric sleeve 2/10/2017

 

TECHNIQUE:  A single contrast UGI study is performed.

 

FINDINGS:

Contrast passes through the proximal gastric sleeve with moderate hesitancy. No extravasation is evid
ent. Through the gastric sleeve there is prompt drainage into the duodenum. No obstruction is evident
.

 

IMPRESSION: Moderate hesitancy of contrast passing through the gastric sleeve. Previous obstruction i
s resolved. No extravasation is evident.

## 2017-02-12 NOTE — PN
DATE OF SERVICE: 02/12/2017. 



SUBJECTIVE: This is a 56-year-old white female who is status post 

gastric sleeve, is having no chest pain or shortness of breath,  some 

mild diffuse abdominal pain. Remains on Synthroid through the IV.  

Updraft treatments are still being given, Protonix IV, scopolamine 

patch is being given. Patient continues to have improvement in nausea, 

vomiting, she is having no chest pain or shortness of breath.  



CARDIOVASCULAR: S1 and S2 

LUNGS: Scattered wheeze. 

HEMATOLOGIC: Negative Homans. 



PLAN: Continue with current treatment. Possible discharge home in the 

morning.

## 2017-02-13 LAB — GLUCOSE BLD-MCNC: 86 MG/DL (ref 75–99)

## 2017-02-13 RX ADMIN — SUCRALFATE SCH GM: 1 TABLET ORAL at 18:20

## 2017-02-13 RX ADMIN — ONDANSETRON PRN MG: 2 INJECTION INTRAMUSCULAR; INTRAVENOUS at 08:20

## 2017-02-13 RX ADMIN — ENOXAPARIN SODIUM SCH MG: 40 INJECTION SUBCUTANEOUS at 08:21

## 2017-02-13 RX ADMIN — ISODIUM CHLORIDE SCH: 0.03 SOLUTION RESPIRATORY (INHALATION) at 07:22

## 2017-02-13 RX ADMIN — ISODIUM CHLORIDE SCH MG: 0.03 SOLUTION RESPIRATORY (INHALATION) at 15:05

## 2017-02-13 RX ADMIN — SUCRALFATE SCH GM: 1 TABLET ORAL at 12:59

## 2017-02-13 RX ADMIN — ISODIUM CHLORIDE SCH: 0.03 SOLUTION RESPIRATORY (INHALATION) at 11:49

## 2017-02-13 RX ADMIN — ISODIUM CHLORIDE SCH: 0.03 SOLUTION RESPIRATORY (INHALATION) at 21:42

## 2017-02-13 RX ADMIN — ENOXAPARIN SODIUM SCH MG: 40 INJECTION SUBCUTANEOUS at 22:21

## 2017-02-13 RX ADMIN — SUCRALFATE SCH GM: 1 TABLET ORAL at 22:03

## 2017-02-13 RX ADMIN — METOCLOPRAMIDE PRN MG: 5 INJECTION, SOLUTION INTRAMUSCULAR; INTRAVENOUS at 00:05

## 2017-02-13 RX ADMIN — LEVOTHYROXINE SODIUM ANHYDROUS SCH MCG: 100 INJECTION, POWDER, LYOPHILIZED, FOR SOLUTION INTRAVENOUS at 08:20

## 2017-02-13 RX ADMIN — METOCLOPRAMIDE PRN MG: 5 INJECTION, SOLUTION INTRAMUSCULAR; INTRAVENOUS at 05:05

## 2017-02-13 RX ADMIN — PANTOPRAZOLE SODIUM SCH MG: 40 INJECTION, POWDER, FOR SOLUTION INTRAVENOUS at 08:20

## 2017-02-13 NOTE — P.PN
Subjective





56-year-old female being seen by the attending this morning.  Patient is being 

followed for medical management at the request of the attending.  Patient is 

postop upper Erlin B sleeve gastrectomy for morbid obesity





Objective





- Vital Signs


Vital signs: 


 Vital Signs











Temp  97.4 F L  02/13/17 11:59


 


Pulse  80   02/13/17 15:06


 


Resp  16   02/13/17 11:59


 


BP  133/75   02/13/17 11:59


 


Pulse Ox  98   02/13/17 11:59








 Intake & Output











 02/12/17 02/13/17 02/13/17





 18:59 06:59 18:59


 


Intake Total  590 


 


Balance  590 


 


Intake:   


 


  Oral  590 


 


Other:   


 


  Voiding Method  Toilet 


 


  # Voids 2 1 














- Exam





Physical exam


56-year-old female sitting up and appears in no acute distress pleasant 

cooperative oriented 3


Lungs essentially clear adequate air movement


Heart S1-S2 audible regular


Abdomen obese soft surgical sites no redness noted


Extremities no edema





- Labs


CBC & Chem 7: 


 02/12/17 09:44





 02/12/17 09:44





Assessment and Plan


Plan: 





Impression


Morbid obesity BMI 39


Status post

## 2017-02-13 NOTE — P.PN
Subjective


Principal diagnosis: 


Status post gastric sleeve resection





Patient is a 56-year-old white female with medical history significant for 

morbid obesity status post laparoscopic sleeve gastrectomy would lysis of 

adhesions, postop day #3.  Patient is evaluated on surgical unit where she is 

complaining of intermittent episodes of nausea without vomiting and slight 

heartburn.  Denies chills, shortness of breath, or chest pain.  Incisional pain 

controlled.  Patient is passing flatus with bowel movement.  Patient has been 

up ambulating.  Patient is tolerating ice chips.  Urine output adequate.  T-max 

the last 24 hours 99.5.








Objective





- Vital Signs


Vital signs: 


 Vital Signs











Temp  99.5 F   02/13/17 15:37


 


Pulse  88   02/13/17 15:37


 


Resp  16   02/13/17 15:37


 


BP  127/86   02/13/17 15:37


 


Pulse Ox  98   02/13/17 15:37








 Intake & Output











 02/12/17 02/13/17 02/13/17





 18:59 06:59 18:59


 


Intake Total  590 1000


 


Balance  590 1000


 


Intake:   


 


  Intake, IV Titration   1000





  Amount   


 


    Dextrose 5%-0.45% NaCl 1,   1000





    000 ml @ 100 mls/hr IV .   





    Q10H LUCINDA Rx#:[id number]   


 


  Oral  590 


 


Other:   


 


  Voiding Method  Toilet 


 


  # Voids 2 1 














- Exam


GENERAL: Pt awake and alert, well-appearing, well-nourished, and in no acute 

distress.


LUNGS: Breath sounds clear to auscultation bilaterally.  No wheezes, rales, or 

rhonchi.


HEART: Heart S1, S2, no S3 or S4.  Regular rate and rhythm.  No murmurs, rubs 

or gallops.


ABDOMEN: Soft, mild incisional tenderness, nondistended, normoactive bowel 

sounds.  No guarding.  Laparoscopic surgical incisions dry and intact, no 

drainage or erythema.


NEUROLOGICAL: Pt oriented x 3. 








- Labs


CBC & Chem 7: 


 02/12/17 09:44





 02/12/17 09:44





Assessment and Plan


Plan: 


Impression:





1.  Morbid obesity status post laparoscopic sleeve gastrectomy with lysis of 

adhesions on 02/10/2017.


2.  GERD.





Plan:





We'll start patient on a bariatric clear liquid diet.  Add Carafate to 

medication regimen.  Continue IV hydration.  Continue supportive treatment and 

pain management.  Continue to follow with medical team.  Repeat CBC, BMP, 

magnesium in a.m.





The above impression and plan have been discussed and directed by Dr. Townsend. 

Mesfin NGUYEN acting as scribe for Dr. Townsend.

## 2017-02-14 VITALS — HEART RATE: 78 BPM | SYSTOLIC BLOOD PRESSURE: 120 MMHG | DIASTOLIC BLOOD PRESSURE: 83 MMHG | TEMPERATURE: 98.6 F

## 2017-02-14 LAB
ANION GAP SERPL CALC-SCNC: 15 MMOL/L
BASOPHILS # BLD AUTO: 0.1 K/UL (ref 0–0.2)
BASOPHILS NFR BLD AUTO: 1 %
BUN SERPL-SCNC: 5 MG/DL (ref 7–17)
CALCIUM SPEC-MCNC: 9.2 MG/DL (ref 8.4–10.2)
CH: 28.3
CHCM: 32.6
CHLORIDE SERPL-SCNC: 106 MMOL/L (ref 98–107)
CO2 SERPL-SCNC: 21 MMOL/L (ref 22–30)
EOSINOPHIL # BLD AUTO: 0.4 K/UL (ref 0–0.7)
EOSINOPHIL NFR BLD AUTO: 4 %
ERYTHROCYTE [DISTWIDTH] IN BLOOD BY AUTOMATED COUNT: 4.83 M/UL (ref 3.8–5.4)
ERYTHROCYTE [DISTWIDTH] IN BLOOD: 13.8 % (ref 11.5–15.5)
GLUCOSE SERPL-MCNC: 93 MG/DL (ref 74–99)
HCT VFR BLD AUTO: 42.2 % (ref 34–46)
HDW: 2.93
HGB BLD-MCNC: 13.3 GM/DL (ref 11.4–16)
LUC NFR BLD AUTO: 1 %
LYMPHOCYTES # SPEC AUTO: 1.5 K/UL (ref 1–4.8)
LYMPHOCYTES NFR SPEC AUTO: 16 %
MAGNESIUM SPEC-SCNC: 1.8 MG/DL (ref 1.6–2.3)
MCH RBC QN AUTO: 27.5 PG (ref 25–35)
MCHC RBC AUTO-ENTMCNC: 31.5 G/DL (ref 31–37)
MCV RBC AUTO: 87.5 FL (ref 80–100)
MONOCYTES # BLD AUTO: 0.7 K/UL (ref 0–1)
MONOCYTES NFR BLD AUTO: 7 %
NEUTROPHILS # BLD AUTO: 6.7 K/UL (ref 1.3–7.7)
NEUTROPHILS NFR BLD AUTO: 71 %
NON-AFRICAN AMERICAN GFR(MDRD): >60
PHOSPHATE SERPL-MCNC: 3.4 MG/DL (ref 2.5–4.5)
POTASSIUM SERPL-SCNC: 3.3 MMOL/L (ref 3.5–5.1)
SODIUM SERPL-SCNC: 142 MMOL/L (ref 137–145)
WBC # BLD AUTO: 0.11 10*3/UL
WBC # BLD AUTO: 9.4 K/UL (ref 3.8–10.6)
WBC (PEROX): 9.5

## 2017-02-14 RX ADMIN — POTASSIUM CHLORIDE SCH: 14.9 INJECTION, SOLUTION INTRAVENOUS at 02:38

## 2017-02-14 RX ADMIN — POTASSIUM CHLORIDE SCH: 14.9 INJECTION, SOLUTION INTRAVENOUS at 02:46

## 2017-02-14 RX ADMIN — ISODIUM CHLORIDE SCH: 0.03 SOLUTION RESPIRATORY (INHALATION) at 06:58

## 2017-02-14 RX ADMIN — POTASSIUM CHLORIDE SCH: 14.9 INJECTION, SOLUTION INTRAVENOUS at 06:33

## 2017-02-14 NOTE — P.DS
Providers


Date of admission: 


02/10/17 10:59





Expected date of discharge: 02/14/17


Attending physician: 


Danish Townsend





Consults: 





 





02/10/17 15:33


Consult Physician Routine 


   Consulting Provider: Sd Shah


   Consult Reason/Comments: Medical management


   Do you want consulting provider notified?: Yes











Primary care physician: 


Delphine Lin MD





Hospital Course: 


Patient is a 56-year-old white female with medical history significant for 

morbid obesity who underwent elective laparoscopic sleeve gastrectomy with 

lysis of adhesions on 02/10/2017.  Patient tolerated procedure well.  Upper GI 

series on 02/11/2017 with obstruction at the level of the proximal gastric 

sleeve.  Patient was kept nothing by mouth overnight with repeat upper GI/

barium swallow on 02/12/2017 with no evidence of obstruction or extravasation.  

Patient did experience moderate nausea with vomiting postoperatively which had 

resolved on the day of discharge.  Patient was able to swallow liquids with no 

evidence of dysphagia.  Incisional pain was controlled.  Patient was urinating 

without difficulty.  Passing flatus without bowel movement.  Patient was felt 

stable for discharge to home with follow-up in the outpatient setting.





Discharge diagnoses: 





1.  Morbid obesity status post laparoscopic sleeve gastrectomy with lysis of 

adhesions on 02/10/2017.


2.  GERD.





The above impression and plan have been discussed and directed by Dr. Townsend. 

Mesfin NUGYEN acting as scribe for Dr. Townsend.





Pertinent Studies: 


Upper GI series; GI/barium swallow x-ray;





Procedures: 


Laparoscopic sleeve gastrectomy with lysis of adhesions





Patient Condition at Discharge: Good





Plan - Discharge Summary


New Discharge Prescriptions: 


HYDROcodone/APAP 7.5-325MG [Norco 7.5-325] 1 each PO Q4H PRN #60 tab


 PRN Reason: Pain


Omeprazole [PriLOSEC] 40 mg PO DAILY #30 capsule.


Ondansetron Odt [Zofran Odt] 8 mg PO Q8HR #20 tab


Sucralfate [Carafate] 1 gm PO ACHS #90 tablet


Discharge Medication List





Levothyroxine Sodium [Synthroid] 25 mcg PO AC-BRKFST 06/05/16 [History]


Sertraline [Zoloft] 100 mg PO HS 06/05/16 [History]


carBAMazepine [TEGretol] 200 mg PO HS 06/05/16 [History]


traZODone HCL [Desyrel] 200 mg PO HS 06/05/16 [History]


Baclofen [Lioresal] 5 mg PO BID PRN 07/07/16 [History]


Multivitamins, Thera [Multivitamin] 2 tab PO DAILY 08/08/16 [History]


Temazepam [Restoril] 30 mg PO HS PRN 08/08/16 [History]


traMADol HCL [Ultram] 50 mg PO Q8HR PRN 08/08/16 [History]


Albuterol Inhaler [Ventolin Hfa Inhaler] 2 puff INHALATION RT-Q6H PRN 01/10/17 [

History]


Loratadine [Claritin] 10 mg PO DAILY PRN 01/10/17 [History]


Calcium Carbonate [Calcium] 1,500 mg PO DAILY 01/30/17 [History]


HYDROcodone/APAP 7.5-325MG [Norco 7.5-325] 1 each PO Q4H PRN #60 tab 02/10/17 [

Rx]


Omeprazole [PriLOSEC] 40 mg PO DAILY #30 capsule. 02/14/17 [Rx]


Ondansetron Odt [Zofran Odt] 8 mg PO Q8HR #20 tab 02/14/17 [Rx]


Sucralfate [Carafate] 1 gm PO ACHS #90 tablet 02/14/17 [Rx]








Follow up Appointment(s)/Referral(s): 


Delphine Lin MD [Primary Care Provider] - 02/20/17 1:45 pm


[facility] [[facility]] - 02/27/17 1:00 pm


Patient Instructions/Handouts:  Skin Adhesive Care (DC), Laparoscopic Sleeve 

Gastrectomy (DC)


Activity/Diet/Wound Care/Special Instructions: 


No heavy lifting, pushing, or pulling items greater than 10 pounds. 


Bariatric diet as previously directed


Shower daily,  no soaking in bath tubs, pools, or hot tubs.  


No driving while taking pain medication.


Notify surgeon with any signs or symptoms of infection, increased pain, or not 

tolerating diet. 


Discharge Disposition: HOME SELF-CARE

## 2017-02-14 NOTE — PN
Patient is doing better today. She is breathing good.  She is back to 

her home medications for her Zoloft, Desyrel and her Tegretol and 

Ventolin inhaler. She feels better at this time. She probably be able 

to be discharged today.  



Temperature 99.2, pulse is in the low 100s. Blood pressure 113/72, O2 

sat 93% on room air.  

CARDIOVASCULAR: S1, S2. 

LUNGS: Clear. 

GI: Soft. 



ASSESSMENT:  

1. Status post gastric sleeve. 

2. History of asthma.

3. History of bipolar. 

4. History of seizures. 



Continue current medicines from home. She will be discharged home. She 

is stable at this time.

## 2017-02-27 ENCOUNTER — HOSPITAL ENCOUNTER (OUTPATIENT)
Dept: HOSPITAL 47 - BARWHC3 | Age: 57
Discharge: HOME | End: 2017-02-27
Attending: SURGERY
Payer: MEDICARE

## 2017-02-27 VITALS
SYSTOLIC BLOOD PRESSURE: 126 MMHG | TEMPERATURE: 97.7 F | HEART RATE: 90 BPM | RESPIRATION RATE: 14 BRPM | DIASTOLIC BLOOD PRESSURE: 74 MMHG

## 2017-02-27 VITALS — BODY MASS INDEX: 37.5 KG/M2

## 2017-02-27 DIAGNOSIS — E86.0: ICD-10-CM

## 2017-02-27 DIAGNOSIS — Z98.84: ICD-10-CM

## 2017-02-27 DIAGNOSIS — Z71.3: ICD-10-CM

## 2017-02-27 DIAGNOSIS — Z48.815: Primary | ICD-10-CM

## 2017-02-27 LAB
ALP SERPL-CCNC: 82 U/L (ref 38–126)
ALT SERPL-CCNC: 35 U/L (ref 9–52)
ANION GAP SERPL CALC-SCNC: 11 MMOL/L
AST SERPL-CCNC: 32 U/L (ref 14–36)
BUN SERPL-SCNC: 10 MG/DL (ref 7–17)
CALCIUM SPEC-MCNC: 9.2 MG/DL (ref 8.4–10.2)
CHLORIDE SERPL-SCNC: 103 MMOL/L (ref 98–107)
CO2 SERPL-SCNC: 27 MMOL/L (ref 22–30)
GLUCOSE SERPL-MCNC: 107 MG/DL (ref 74–99)
NON-AFRICAN AMERICAN GFR(MDRD): >60
POTASSIUM SERPL-SCNC: 4.5 MMOL/L (ref 3.5–5.1)
PROT SERPL-MCNC: 6.6 G/DL (ref 6.3–8.2)
SODIUM SERPL-SCNC: 141 MMOL/L (ref 137–145)

## 2017-02-27 PROCEDURE — 80053 COMPREHEN METABOLIC PANEL: CPT

## 2017-02-27 PROCEDURE — 99211 OFF/OP EST MAY X REQ PHY/QHP: CPT

## 2017-02-27 PROCEDURE — 97803 MED NUTRITION INDIV SUBSEQ: CPT

## 2017-02-27 NOTE — P.HPBAR
Bariatric H&P





- History & Physicial


H&P Date: 02/27/17


History & Physicial: 


Visit/CC: sleeve f/u (02/10/17)





Patient initial contact: 





Initial weight: 91.762 kg


Initial weight in pounds: 202.30


Height: 5 ft 2 in


Initial BMI: 37.0





Last weight: 





Current weight: 93.259 kg


Current weight in pounds: 205.60


Current BMI: 37.5





Ideal body weight (based on NIH guidelines): 49.895 kg





Excess body weight loss: 








The patient is a 56 year-old F who presents for Bariatric Assessment.  The 

patient status post sleeve gastrectomy.  She had trouble dehydration last week.

  She received IV fluids in the emergency room at [facility].  Patient 

states her potassium was 3.2.  Patient states he feels better today she is 

still able to eat and drink slowly.














Review of Systems


Constitutional: Reports as per HPI





Past Medical History


Past Medical History: Asthma, Fibromyalgia, GERD/Reflux, Musculoskeletal 

Disorder, Osteoarthritis (OA), Skin Disorder, Thyroid Disorder


Additional Past Medical History / Comment(s): IBS, MIGRAINES, HIATAL HERNIA, 

GOUT, ROSACEA, HERNIATED DISCS WITH BACK PAIN - [facility] PROCEDURES, LOW 

THYROID, COUGH DUE TO SINUS DRAINAGE .


History of Any Multi-Drug Resistant Organisms: None Reported


Past Surgical History: Adenoidectomy, Appendectomy, Bariatric Surgery, 

Cholecystectomy, Heart Catheterization, Orthopedic Surgery, Tonsillectomy, 

Tubal Ligation, Uterine Ablation


Additional Past Surgical History / Comment(s): tendon/ligament repair in foot, 

fredo knee arthroscopy, lipoma out of rt. shoulder, lap band insertion and  

removed, rt hand surgery,.


Past Anesthesia/Blood Transfusion Reactions: Motion Sickness


Past Psychological History: Anxiety, Bipolar, Depression


Additional Psychological History / Comment(s): Major depressive disorder,  

suicide attempts x 4,


Smoking Status: Never smoker


Past Alcohol Use History: None Reported


Past Drug Use History: None Reported





- Past Family History


  ** Mother


Family Medical History: Cancer


Additional Family Medical History / Comment(s): Pancreatic cancer





  ** Father


Family Medical History: Cancer, Deep Vein Thrombosis (DVT), Pulmonary Embolus


Additional Family Medical History / Comment(s): Prostate  cancer





Surgical - Exam


 Vital Signs











Temp Pulse Resp BP


 


 97.7 F   90   14   126/74 


 


 02/27/17 13:03  02/27/17 13:03  02/27/17 13:03  02/27/17 13:03














- General


well developed, no distress





- Eyes


PERRL





- ENT


normal pinna





- Neck


no masses





- Respiratory


normal expansion





- Cardiovascular


Rhythm: regular





- Abdomen


Abdomen: soft, non tender





Bariatric Assessment & Plan


Plan: 


Status post sleeve yesterday.  Patient's dehydration has improved.  She'll 

follow-up in one week for recheck.








Bariatric Checklist


Checklist: 


Plan: 





Checklist: 





EGD: 


1. Hiatal hernia: 


2. H. Pylori: 





HgbA1c: 





Vitamin D: 





Smoking: Never smoker





Primary care physician referral: Delphine Humphries)





Psychiatry clearance: 





Cardiology clearance: 





Sleep study: 





Diet journal: 





VTE risk score: 





VTE risk level: 





Rehab needs at discharge:

## 2017-03-06 ENCOUNTER — HOSPITAL ENCOUNTER (OUTPATIENT)
Dept: HOSPITAL 47 - BARWHC3 | Age: 57
Discharge: HOME | End: 2017-03-06
Attending: SURGERY
Payer: MEDICARE

## 2017-03-06 VITALS — SYSTOLIC BLOOD PRESSURE: 121 MMHG | DIASTOLIC BLOOD PRESSURE: 88 MMHG | TEMPERATURE: 98 F | HEART RATE: 86 BPM

## 2017-03-06 VITALS — BODY MASS INDEX: 36.8 KG/M2

## 2017-03-06 VITALS — RESPIRATION RATE: 16 BRPM

## 2017-03-06 DIAGNOSIS — E66.01: ICD-10-CM

## 2017-03-06 DIAGNOSIS — Z98.84: ICD-10-CM

## 2017-03-06 DIAGNOSIS — E86.0: ICD-10-CM

## 2017-03-06 DIAGNOSIS — Z48.815: Primary | ICD-10-CM

## 2017-03-06 LAB
ALP SERPL-CCNC: 76 U/L (ref 38–126)
ALT SERPL-CCNC: 37 U/L (ref 9–52)
ANION GAP SERPL CALC-SCNC: 13 MMOL/L
AST SERPL-CCNC: 28 U/L (ref 14–36)
BASOPHILS # BLD AUTO: 0.1 K/UL (ref 0–0.2)
BASOPHILS NFR BLD AUTO: 1 %
BUN SERPL-SCNC: 10 MG/DL (ref 7–17)
CALCIUM SPEC-MCNC: 8.8 MG/DL (ref 8.4–10.2)
CH: 27.9
CHCM: 31.7
CHLORIDE SERPL-SCNC: 107 MMOL/L (ref 98–107)
CO2 SERPL-SCNC: 19 MMOL/L (ref 22–30)
EOSINOPHIL # BLD AUTO: 0.3 K/UL (ref 0–0.7)
EOSINOPHIL NFR BLD AUTO: 5 %
ERYTHROCYTE [DISTWIDTH] IN BLOOD BY AUTOMATED COUNT: 4.99 M/UL (ref 3.8–5.4)
ERYTHROCYTE [DISTWIDTH] IN BLOOD: 13.8 % (ref 11.5–15.5)
GLUCOSE SERPL-MCNC: 72 MG/DL (ref 74–99)
HCT VFR BLD AUTO: 44.2 % (ref 34–46)
HDW: 2.79
HGB BLD-MCNC: 14 GM/DL (ref 11.4–16)
LUC NFR BLD AUTO: 2 %
LYMPHOCYTES # SPEC AUTO: 1.6 K/UL (ref 1–4.8)
LYMPHOCYTES NFR SPEC AUTO: 27 %
MCH RBC QN AUTO: 28 PG (ref 25–35)
MCHC RBC AUTO-ENTMCNC: 31.7 G/DL (ref 31–37)
MCV RBC AUTO: 88.5 FL (ref 80–100)
MONOCYTES # BLD AUTO: 0.3 K/UL (ref 0–1)
MONOCYTES NFR BLD AUTO: 6 %
NEUTROPHILS # BLD AUTO: 3.6 K/UL (ref 1.3–7.7)
NEUTROPHILS NFR BLD AUTO: 59 %
NON-AFRICAN AMERICAN GFR(MDRD): >60
POTASSIUM SERPL-SCNC: 4.2 MMOL/L (ref 3.5–5.1)
PROT SERPL-MCNC: 6.2 G/DL (ref 6.3–8.2)
SODIUM SERPL-SCNC: 139 MMOL/L (ref 137–145)
WBC # BLD AUTO: 0.13 10*3/UL
WBC # BLD AUTO: 6 K/UL (ref 3.8–10.6)
WBC (PEROX): 6.23

## 2017-03-06 PROCEDURE — 96360 HYDRATION IV INFUSION INIT: CPT

## 2017-03-06 PROCEDURE — 99211 OFF/OP EST MAY X REQ PHY/QHP: CPT

## 2017-03-06 PROCEDURE — 80053 COMPREHEN METABOLIC PANEL: CPT

## 2017-03-06 PROCEDURE — 85025 COMPLETE CBC W/AUTO DIFF WBC: CPT

## 2017-03-06 PROCEDURE — 96361 HYDRATE IV INFUSION ADD-ON: CPT

## 2017-03-06 RX ADMIN — CEFAZOLIN SCH MLS/HR: 330 INJECTION, POWDER, FOR SOLUTION INTRAMUSCULAR; INTRAVENOUS at 16:12

## 2017-03-06 RX ADMIN — CEFAZOLIN SCH MLS/HR: 330 INJECTION, POWDER, FOR SOLUTION INTRAMUSCULAR; INTRAVENOUS at 15:05

## 2017-03-06 NOTE — P.HPBAR
Bariatric H&P





- History & Physicial


H&P Date: 03/06/17


History & Physicial: 


Visit/CC: sleeve follow-up





Patient initial contact: 





Initial weight: 91.762 kg


Initial weight in pounds: 202.30


Height: 5 ft 2 in


Initial BMI: 37.0





Last weight: 





Current weight: 91.2 kg


Current weight in pounds: 201.00


Current BMI: 36.8





Ideal body weight (based on NIH guidelines): 49.895 kg





Excess body weight loss: 1.4%








The patient is a 56 year-old F who presents for Bariatric Assessment.  Patient 

states that she has had poor oral intake.  She feels tired today.














Past Medical History


Past Medical History: Asthma, Fibromyalgia, GERD/Reflux, Musculoskeletal 

Disorder, Osteoarthritis (OA), Skin Disorder, Thyroid Disorder


Additional Past Medical History / Comment(s): IBS, MIGRAINES, HIATAL HERNIA, 

GOUT, ROSACEA, HERNIATED DISCS WITH BACK PAIN - [facility] PROCEDURES, LOW 

THYROID, COUGH DUE TO SINUS DRAINAGE .


History of Any Multi-Drug Resistant Organisms: None Reported


Past Surgical History: Adenoidectomy, Appendectomy, Bariatric Surgery, 

Cholecystectomy, Heart Catheterization, Orthopedic Surgery, Tonsillectomy, 

Tubal Ligation, Uterine Ablation


Additional Past Surgical History / Comment(s): tendon/ligament repair in foot, 

fredo knee arthroscopy, lipoma out of rt. shoulder, lap band insertion and  

removed, rt hand surgery,.


Past Anesthesia/Blood Transfusion Reactions: Motion Sickness


Past Psychological History: Anxiety, Bipolar, Depression


Additional Psychological History / Comment(s): Major depressive disorder,  

suicide attempts x 4,


Smoking Status: Never smoker


Past Alcohol Use History: None Reported


Past Drug Use History: None Reported





- Past Family History


  ** Mother


Family Medical History: Cancer


Additional Family Medical History / Comment(s): Pancreatic cancer





  ** Father


Family Medical History: Cancer, Deep Vein Thrombosis (DVT), Pulmonary Embolus


Additional Family Medical History / Comment(s): Prostate  cancer





Surgical - Exam


 Vital Signs











Temp Pulse Resp BP


 


 97.6 F   90   16   135/82 


 


 03/06/17 14:43  03/06/17 14:43  03/06/17 14:43  03/06/17 14:43














- General


well nourished, no distress





- Eyes


PERRL





- ENT


normal pinna, normal nares





- Neck


no masses





- Cardiovascular


Rhythm: regular





- Abdomen


Abdomen: soft, non tender





Bariatric Assessment & Plan


Plan: 





The patient will have her labs checked and have 2 L of normal saline given for 

possible dehydration.  The patient has had limited oral intake yesterday.  

After hydration she'll go home and possibly follow-up tomorrow.





Bariatric Checklist


Checklist: 


Plan: 





Checklist: 





EGD: 


1. Hiatal hernia: 


2. H. Pylori: 





HgbA1c: 





Vitamin D: 





Smoking: Never smoker





Primary care physician referral: Delphine HerronAdam)





Psychiatry clearance: 





Cardiology clearance: 





Sleep study: 





Diet journal: 





VTE risk score: 





VTE risk level: 





Rehab needs at discharge:

## 2017-03-13 ENCOUNTER — HOSPITAL ENCOUNTER (OUTPATIENT)
Dept: HOSPITAL 47 - BARWHC3 | Age: 57
Discharge: HOME | End: 2017-03-13
Attending: SURGERY
Payer: MEDICARE

## 2017-03-13 VITALS — BODY MASS INDEX: 36.6 KG/M2

## 2017-03-13 VITALS — DIASTOLIC BLOOD PRESSURE: 87 MMHG | TEMPERATURE: 97.6 F | SYSTOLIC BLOOD PRESSURE: 136 MMHG | HEART RATE: 76 BPM

## 2017-03-13 DIAGNOSIS — E66.01: ICD-10-CM

## 2017-03-13 DIAGNOSIS — Z48.815: Primary | ICD-10-CM

## 2017-03-13 DIAGNOSIS — K21.9: ICD-10-CM

## 2017-03-13 DIAGNOSIS — Z98.84: ICD-10-CM

## 2017-03-13 DIAGNOSIS — R13.10: ICD-10-CM

## 2017-03-13 DIAGNOSIS — Z71.3: ICD-10-CM

## 2017-03-13 PROCEDURE — 97803 MED NUTRITION INDIV SUBSEQ: CPT

## 2017-03-13 PROCEDURE — 99211 OFF/OP EST MAY X REQ PHY/QHP: CPT

## 2017-03-13 NOTE — P.HPBAR
Bariatric H&P





- History & Physicial


H&P Date: 03/13/17


History & Physicial: 


Visit/CC: 





Patient initial contact: 





Initial weight: 91.762 kg


Initial weight in pounds: 202.30


Height: 5 ft 2 in


Initial BMI: 37.0





Last weight: 





Current weight: 90.991 kg


Current weight in pounds: 200.60


Current BMI: 36.6





Ideal body weight (based on NIH guidelines): 49.895 kg





Excess body weight loss: 1.8%








The patient is a 56 year-old F who presents for Bariatric Assessment.  Patient 

states she feels better this week.  She's had minimal GERD and dysphagia.  She 

is actually tolerating more liquids.














Past Medical History


Past Medical History: Asthma, Fibromyalgia, GERD/Reflux, Musculoskeletal 

Disorder, Osteoarthritis (OA), Skin Disorder, Thyroid Disorder


Additional Past Medical History / Comment(s): IBS, MIGRAINES, HIATAL HERNIA, 

GOUT, ROSACEA, HERNIATED DISCS WITH BACK PAIN - [facility] PROCEDURES, LOW 

THYROID, COUGH DUE TO SINUS DRAINAGE .


History of Any Multi-Drug Resistant Organisms: None Reported


Past Surgical History: Adenoidectomy, Appendectomy, Bariatric Surgery, 

Cholecystectomy, Heart Catheterization, Orthopedic Surgery, Tonsillectomy, 

Tubal Ligation, Uterine Ablation


Additional Past Surgical History / Comment(s): tendon/ligament repair in foot, 

fredo knee arthroscopy, lipoma out of rt. shoulder, lap band insertion and  

removed, rt hand surgery,.


Past Anesthesia/Blood Transfusion Reactions: Motion Sickness


Past Psychological History: Anxiety, Bipolar, Depression


Additional Psychological History / Comment(s): Major depressive disorder,  

suicide attempts x 4,


Smoking Status: Never smoker


Past Alcohol Use History: None Reported


Past Drug Use History: None Reported





- Past Family History


  ** Mother


Family Medical History: Cancer


Additional Family Medical History / Comment(s): Pancreatic cancer





  ** Father


Family Medical History: Cancer, Deep Vein Thrombosis (DVT), Pulmonary Embolus


Additional Family Medical History / Comment(s): Prostate  cancer





Surgical - Exam


 Vital Signs











Temp Pulse BP


 


 97.6 F   76   136/87 


 


 03/13/17 15:20  03/13/17 15:20  03/13/17 15:20














- General


well developed, no distress





- Eyes


PERRL





- ENT


normal pinna





- Neck


no masses





- Respiratory


normal expansion





- Cardiovascular


Rhythm: regular





- Abdomen


Abdomen: soft, non tender





Bariatric Assessment & Plan


Plan: 





Status post sleeve gastrectomy.  Patient's GERD symptoms improved.  Her 

dysphagia is improved.  She'll follow-up in 2 weeks for recheck.





Bariatric Checklist


Checklist: 


Plan: 





Checklist: 





EGD: 


1. Hiatal hernia: 


2. H. Pylori: 





HgbA1c: 





Vitamin D: 





Smoking: Never smoker





Primary care physician referral: Delphine HerronAdam)





Psychiatry clearance: 





Cardiology clearance: 





Sleep study: 





Diet journal: 





VTE risk score: 





VTE risk level: 





Rehab needs at discharge:

## 2017-03-27 ENCOUNTER — HOSPITAL ENCOUNTER (OUTPATIENT)
Dept: HOSPITAL 47 - BARWHC3 | Age: 57
Discharge: HOME | End: 2017-03-27
Attending: SURGERY
Payer: MEDICARE

## 2017-03-27 VITALS — SYSTOLIC BLOOD PRESSURE: 117 MMHG | DIASTOLIC BLOOD PRESSURE: 69 MMHG | HEART RATE: 83 BPM

## 2017-03-27 VITALS — BODY MASS INDEX: 36.2 KG/M2

## 2017-03-27 VITALS — TEMPERATURE: 98 F

## 2017-03-27 DIAGNOSIS — R13.10: ICD-10-CM

## 2017-03-27 DIAGNOSIS — Z48.815: Primary | ICD-10-CM

## 2017-03-27 DIAGNOSIS — K21.9: ICD-10-CM

## 2017-03-27 DIAGNOSIS — Z98.84: ICD-10-CM

## 2017-03-27 PROCEDURE — 99211 OFF/OP EST MAY X REQ PHY/QHP: CPT

## 2017-03-27 NOTE — P.HPBAR
Bariatric H&P





- History & Physicial


H&P Date: 03/27/17


History & Physicial: 


Visit/CC: danalakiasulaiman





Patient initial contact: 





Initial weight: 91.762 kg


Initial weight in pounds: 202.30


Height: 5 ft 2 in


Initial BMI: 37.0





Last weight: 





Current weight: 89.947 kg


Current weight in pounds: 198.30


Current BMI: 36.2





Ideal body weight (based on NIH guidelines): 49.895 kg





Excess body weight loss: 4.3%








The patient is a 56 year-old F who presents for Bariatric Assessment.  Patient 

presents for sleeve gastrectomy follow-up.  She is doing fairly well.  Her 

dysphagia and GERD symptoms have improved.  She had some minimal cellulitis of 

her umbilicus which was treated with Bactrim by her primary care doctor.  The 

cellulitis has resolved per the patient.














Past Medical History


Past Medical History: Asthma, Fibromyalgia, GERD/Reflux, Musculoskeletal 

Disorder, Osteoarthritis (OA), Skin Disorder, Thyroid Disorder


Additional Past Medical History / Comment(s): IBS, MIGRAINES, HIATAL HERNIA, 

GOUT, ROSACEA, HERNIATED DISCS WITH BACK PAIN - [facility] PROCEDURES, LOW 

THYROID, COUGH DUE TO SINUS DRAINAGE .


History of Any Multi-Drug Resistant Organisms: None Reported


Past Surgical History: Adenoidectomy, Appendectomy, Bariatric Surgery, 

Cholecystectomy, Heart Catheterization, Orthopedic Surgery, Tonsillectomy, 

Tubal Ligation, Uterine Ablation


Additional Past Surgical History / Comment(s): tendon/ligament repair in foot, 

fredo knee arthroscopy, lipoma out of rt. shoulder, lap band insertion and  

removed, rt hand surgery,.


Past Anesthesia/Blood Transfusion Reactions: Motion Sickness


Past Psychological History: Anxiety, Bipolar, Depression


Additional Psychological History / Comment(s): Major depressive disorder,  

suicide attempts x 4,


Smoking Status: Never smoker


Past Alcohol Use History: None Reported


Past Drug Use History: None Reported





- Past Family History


  ** Mother


Family Medical History: Cancer


Additional Family Medical History / Comment(s): Pancreatic cancer





  ** Father


Family Medical History: Cancer, Deep Vein Thrombosis (DVT), Pulmonary Embolus


Additional Family Medical History / Comment(s): Prostate  cancer





Surgical - Exam


 Vital Signs











Temp Pulse BP


 


 98 F   83   117/69 


 


 03/27/17 13:46  03/27/17 13:46  03/27/17 13:46














- General


well developed, no distress





- Eyes


PERRL





- ENT


normal pinna





- Neck


no masses





- Respiratory


normal expansion





- Cardiovascular


Rhythm: regular





- Abdomen


Abdomen: soft, non tender





Bariatric Assessment & Plan


Plan: 





The patient is doing well status post sleeve yesterday.  Her GERD and dysphagia 

symptoms have improved.  The patient will follow-up in one month for recheck





Bariatric Checklist


Checklist: 


Plan: 





Checklist: 





EGD: 


1. Hiatal hernia: 


2. H. Pylori: 





HgbA1c: 





Vitamin D: 





Smoking: Never smoker





Primary care physician referral: Delphine HerronAdam)





Psychiatry clearance: 





Cardiology clearance: 





Sleep study: 





Diet journal: 





VTE risk score: 





VTE risk level: 





Rehab needs at discharge:

## 2017-04-24 ENCOUNTER — HOSPITAL ENCOUNTER (OUTPATIENT)
Dept: HOSPITAL 47 - BARWHC3 | Age: 57
Discharge: HOME | End: 2017-04-24
Attending: SURGERY
Payer: MEDICARE

## 2017-04-24 VITALS — HEART RATE: 72 BPM | TEMPERATURE: 97.9 F | DIASTOLIC BLOOD PRESSURE: 53 MMHG | SYSTOLIC BLOOD PRESSURE: 98 MMHG

## 2017-04-24 VITALS — BODY MASS INDEX: 35.2 KG/M2

## 2017-04-24 DIAGNOSIS — Z48.815: Primary | ICD-10-CM

## 2017-04-24 DIAGNOSIS — Z79.899: ICD-10-CM

## 2017-04-24 DIAGNOSIS — Z98.84: ICD-10-CM

## 2017-04-24 PROCEDURE — 99211 OFF/OP EST MAY X REQ PHY/QHP: CPT

## 2017-04-24 NOTE — P.HPBAR
Bariatric H&P





- History & Physicial


H&P Date: 04/24/17


History & Physicial: 


Visit/CC: post op follow up visit





Patient initial contact: 





Initial weight: 91.762 kg


Initial weight in pounds: 202.30


Height: 5 ft 2 in


Initial BMI: 37.0





Last weight: 





Current weight: 87.271 kg


Current weight in pounds: 192.40


Current BMI: 35.2





Ideal body weight (based on NIH guidelines): 49.895 kg





Excess body weight loss: 10.7%








The patient is a 56 year-old F who presents for Bariatric Assessment.  Patient 

presents for bariatric follow-up.  She has complaints of some drainage near her 

umbilicus.  The patient states that last week she had some drainage above her 

umbilicus.  It has improved.  She still has some pain in the area.














Past Medical History


Past Medical History: Asthma, Fibromyalgia, GERD/Reflux, Musculoskeletal 

Disorder, Osteoarthritis (OA), Skin Disorder, Thyroid Disorder


Additional Past Medical History / Comment(s): IBS, MIGRAINES, HIATAL HERNIA, 

GOUT, ROSACEA, HERNIATED DISCS WITH BACK PAIN - [facility] PROCEDURES, LOW 

THYROID, COUGH DUE TO SINUS DRAINAGE .


History of Any Multi-Drug Resistant Organisms: None Reported


Past Surgical History: Adenoidectomy, Appendectomy, Bariatric Surgery, 

Cholecystectomy, Heart Catheterization, Orthopedic Surgery, Tonsillectomy, 

Tubal Ligation, Uterine Ablation


Additional Past Surgical History / Comment(s): tendon/ligament repair in foot, 

fredo knee arthroscopy, lipoma out of rt. shoulder, lap band insertion and  

removed, rt hand surgery,.


Past Anesthesia/Blood Transfusion Reactions: Motion Sickness


Past Psychological History: Anxiety, Bipolar, Depression


Additional Psychological History / Comment(s): Major depressive disorder,  

suicide attempts x 4,


Smoking Status: Never smoker


Past Alcohol Use History: None Reported


Past Drug Use History: None Reported





- Past Family History


  ** Mother


Family Medical History: Cancer


Additional Family Medical History / Comment(s): Pancreatic cancer





  ** Father


Family Medical History: Cancer, Deep Vein Thrombosis (DVT), Pulmonary Embolus


Additional Family Medical History / Comment(s): Prostate  cancer





Surgical - Exam


 Vital Signs











Temp Pulse BP


 


 97.9 F   72   98/53 


 


 04/24/17 16:26  04/24/17 16:26  04/24/17 16:26














- General


well developed, no distress





- Eyes


PERRL





- ENT


normal pinna





- Neck


no masses





- Respiratory


normal expansion





- Cardiovascular


Rhythm: regular





- Abdomen





Mild induration approximately 5 cm above the umbilicus.  The patient's previous 

incision site is clean dry tach.


Abdomen: soft, non tender





Bariatric Assessment & Plan


Plan: 





Simultaneous abscess.  Perform incision and drainage in the office.  There is 

no significant purulent drainage.  The patient's placed on Bactrim DS 1 tablet 

by mouth twice a day.  She'll follow up in one week.





Bariatric Checklist


Checklist: 


Plan: 





Checklist: 





EGD: 


1. Hiatal hernia: 


2. H. Pylori: 





HgbA1c: 





Vitamin D: 





Smoking: Never smoker





Primary care physician referral: Delphine HerronAdam)





Psychiatry clearance: 





Cardiology clearance: 





Sleep study: 





Diet journal: 





VTE risk score: 





VTE risk level: 





Rehab needs at discharge:

## 2017-05-01 ENCOUNTER — HOSPITAL ENCOUNTER (OUTPATIENT)
Dept: HOSPITAL 47 - BARWHC3 | Age: 57
End: 2017-05-01
Attending: SURGERY
Payer: MEDICARE

## 2017-05-01 VITALS
DIASTOLIC BLOOD PRESSURE: 73 MMHG | SYSTOLIC BLOOD PRESSURE: 113 MMHG | HEART RATE: 79 BPM | TEMPERATURE: 98.1 F | RESPIRATION RATE: 16 BRPM

## 2017-05-01 VITALS — BODY MASS INDEX: 34.6 KG/M2

## 2017-05-01 DIAGNOSIS — K21.9: ICD-10-CM

## 2017-05-01 DIAGNOSIS — E66.01: ICD-10-CM

## 2017-05-01 DIAGNOSIS — Z48.815: Primary | ICD-10-CM

## 2017-05-01 DIAGNOSIS — Z98.84: ICD-10-CM

## 2017-05-01 DIAGNOSIS — M19.90: ICD-10-CM

## 2017-05-01 LAB
ALP SERPL-CCNC: 82 U/L (ref 38–126)
ALT SERPL-CCNC: 32 U/L (ref 9–52)
ANION GAP SERPL CALC-SCNC: 10 MMOL/L
AST SERPL-CCNC: 26 U/L (ref 14–36)
BUN SERPL-SCNC: 12 MG/DL (ref 7–17)
CALCIUM SPEC-MCNC: 9.4 MG/DL (ref 8.4–10.2)
CH: 27.5
CHCM: 31.9
CHLORIDE SERPL-SCNC: 104 MMOL/L (ref 98–107)
CO2 SERPL-SCNC: 25 MMOL/L (ref 22–30)
ERYTHROCYTE [DISTWIDTH] IN BLOOD BY AUTOMATED COUNT: 4.86 M/UL (ref 3.8–5.4)
ERYTHROCYTE [DISTWIDTH] IN BLOOD: 14.4 % (ref 11.5–15.5)
GLUCOSE SERPL-MCNC: 91 MG/DL (ref 74–99)
HCT VFR BLD AUTO: 42.1 % (ref 34–46)
HDW: 2.71
HGB BLD-MCNC: 13.3 GM/DL (ref 11.4–16)
MCH RBC QN AUTO: 27.4 PG (ref 25–35)
MCHC RBC AUTO-ENTMCNC: 31.6 G/DL (ref 31–37)
MCV RBC AUTO: 86.7 FL (ref 80–100)
NON-AFRICAN AMERICAN GFR(MDRD): >60
POTASSIUM SERPL-SCNC: 4.2 MMOL/L (ref 3.5–5.1)
PROT SERPL-MCNC: 6.9 G/DL (ref 6.3–8.2)
SODIUM SERPL-SCNC: 139 MMOL/L (ref 137–145)
VIT B12 SERPL-MCNC: 758 PG/ML (ref 239–931)
WBC # BLD AUTO: 6.3 K/UL (ref 3.8–10.6)

## 2017-05-01 PROCEDURE — 99211 OFF/OP EST MAY X REQ PHY/QHP: CPT

## 2017-05-01 PROCEDURE — 84425 ASSAY OF VITAMIN B-1: CPT

## 2017-05-01 PROCEDURE — 97803 MED NUTRITION INDIV SUBSEQ: CPT

## 2017-05-01 PROCEDURE — 85027 COMPLETE CBC AUTOMATED: CPT

## 2017-05-01 PROCEDURE — 82306 VITAMIN D 25 HYDROXY: CPT

## 2017-05-01 PROCEDURE — 82607 VITAMIN B-12: CPT

## 2017-05-01 PROCEDURE — 80053 COMPREHEN METABOLIC PANEL: CPT

## 2017-05-01 NOTE — P.HPBAR
Bariatric H&P





- History & Physicial


H&P Date: 05/01/17


History & Physicial: 


Visit/CC: Sleeve follow-up





Patient initial contact: 





Initial weight: 91.762 kg


Initial weight in pounds: 202.30


Height: 5 ft 2 in


Initial BMI: 37.0





Last weight: 





Current weight: 85.956 kg


Current weight in pounds: 189.00


Current BMI: 34.6





Ideal body weight (based on NIH guidelines): 49.895 kg





Excess body weight loss: 14.4%








The patient is a 56 year-old F who presents for Bariatric Assessment.  The 

patient presents for sleeve gastrectomy follow-up.  She's had good weight loss.

  She's had some Augmentin GERD and arthritis.  Her umbilical pain has resolved.














Past Medical History


Past Medical History: Asthma, Fibromyalgia, GERD/Reflux, Musculoskeletal 

Disorder, Osteoarthritis (OA), Skin Disorder, Thyroid Disorder


Additional Past Medical History / Comment(s): IBS, MIGRAINES, HIATAL HERNIA, 

GOUT, ROSACEA, HERNIATED DISCS WITH BACK PAIN - [facility] PROCEDURES, LOW 

THYROID, COUGH DUE TO SINUS DRAINAGE .


History of Any Multi-Drug Resistant Organisms: None Reported


Past Surgical History: Adenoidectomy, Appendectomy, Bariatric Surgery, 

Cholecystectomy, Heart Catheterization, Orthopedic Surgery, Tonsillectomy, 

Tubal Ligation, Uterine Ablation


Additional Past Surgical History / Comment(s): tendon/ligament repair in foot, 

fredo knee arthroscopy, lipoma out of rt. shoulder, lap band insertion and  

removed, rt hand surgery,.


Past Anesthesia/Blood Transfusion Reactions: Motion Sickness


Past Psychological History: Anxiety, Bipolar, Depression


Additional Psychological History / Comment(s): Major depressive disorder,  

suicide attempts x 4,


Smoking Status: Never smoker


Past Alcohol Use History: None Reported


Past Drug Use History: None Reported





- Past Family History


  ** Mother


Family Medical History: Cancer


Additional Family Medical History / Comment(s): Pancreatic cancer





  ** Father


Family Medical History: Cancer, Deep Vein Thrombosis (DVT), Pulmonary Embolus


Additional Family Medical History / Comment(s): Prostate  cancer





Surgical - Exam


 Vital Signs











Temp Pulse Resp BP


 


 98.1 F   79   16   113/73 


 


 05/01/17 13:19  05/01/17 13:19  05/01/17 13:19  05/01/17 13:19














- General


well developed, no distress





- Eyes


PERRL





- ENT


normal pinna





- Neck


no masses





- Respiratory


normal expansion





- Cardiovascular


Rhythm: regular





- Abdomen


Abdomen: soft, non tender





Results





- Labs





 05/01/17 14:12





 05/01/17 14:12


 Diabetes panel











  05/01/17 Range/Units





  14:12 


 


Sodium  139  (137-145)  mmol/L


 


Potassium  4.2  (3.5-5.1)  mmol/L


 


Chloride  104  ()  mmol/L


 


Carbon Dioxide  25  (22-30)  mmol/L


 


BUN  12  (7-17)  mg/dL


 


Creatinine  0.73  (0.52-1.04)  mg/dL


 


Glucose  91  (74-99)  mg/dL


 


Calcium  9.4  (8.4-10.2)  mg/dL


 


AST  26  (14-36)  U/L


 


ALT  32  (9-52)  U/L


 


Alkaline Phosphatase  82  ()  U/L


 


Total Protein  6.9  (6.3-8.2)  g/dL


 


Albumin  4.0  (3.5-5.0)  g/dL








 Calcium panel











  05/01/17 Range/Units





  14:12 


 


Calcium  9.4  (8.4-10.2)  mg/dL


 


Albumin  4.0  (3.5-5.0)  g/dL








 Pituitary panel











  05/01/17 Range/Units





  14:12 


 


Sodium  139  (137-145)  mmol/L


 


Potassium  4.2  (3.5-5.1)  mmol/L


 


Chloride  104  ()  mmol/L


 


Carbon Dioxide  25  (22-30)  mmol/L


 


BUN  12  (7-17)  mg/dL


 


Creatinine  0.73  (0.52-1.04)  mg/dL


 


Glucose  91  (74-99)  mg/dL


 


Calcium  9.4  (8.4-10.2)  mg/dL








 Adrenal panel











  05/01/17 Range/Units





  14:12 


 


Sodium  139  (137-145)  mmol/L


 


Potassium  4.2  (3.5-5.1)  mmol/L


 


Chloride  104  ()  mmol/L


 


Carbon Dioxide  25  (22-30)  mmol/L


 


BUN  12  (7-17)  mg/dL


 


Creatinine  0.73  (0.52-1.04)  mg/dL


 


Glucose  91  (74-99)  mg/dL


 


Calcium  9.4  (8.4-10.2)  mg/dL


 


Total Bilirubin  0.5  (0.2-1.3)  mg/dL


 


AST  26  (14-36)  U/L


 


ALT  32  (9-52)  U/L


 


Alkaline Phosphatase  82  ()  U/L


 


Total Protein  6.9  (6.3-8.2)  g/dL


 


Albumin  4.0  (3.5-5.0)  g/dL














Bariatric Assessment & Plan


Plan: 





Status post sleeve yesterday.  Patient is doing quite well with her weight 

loss.  The patient will continue omeprazole for treatment of her GERD symptoms.

  Her back pain arthritis is remain stable.





Bariatric Checklist


Checklist: 


Plan: 





Checklist: 





EGD: 


1. Hiatal hernia: 


2. H. Pylori: 





HgbA1c: 





Vitamin D: 





Smoking: Never smoker





Primary care physician referral: Delphine Humphries)





Psychiatry clearance: 





Cardiology clearance: 





Sleep study: 





Diet journal: 





VTE risk score: 





VTE risk level: 





Rehab needs at discharge:

## 2017-06-05 ENCOUNTER — HOSPITAL ENCOUNTER (OUTPATIENT)
Dept: HOSPITAL 47 - BARWHC3 | Age: 57
Discharge: HOME | End: 2017-06-05
Attending: SURGERY
Payer: MEDICARE

## 2017-06-05 VITALS — DIASTOLIC BLOOD PRESSURE: 56 MMHG | HEART RATE: 71 BPM | TEMPERATURE: 97.8 F | SYSTOLIC BLOOD PRESSURE: 118 MMHG

## 2017-06-05 VITALS — BODY MASS INDEX: 32.7 KG/M2

## 2017-06-05 DIAGNOSIS — J45.909: ICD-10-CM

## 2017-06-05 DIAGNOSIS — F41.9: ICD-10-CM

## 2017-06-05 DIAGNOSIS — Z98.84: ICD-10-CM

## 2017-06-05 DIAGNOSIS — F32.9: ICD-10-CM

## 2017-06-05 DIAGNOSIS — M79.7: ICD-10-CM

## 2017-06-05 DIAGNOSIS — M19.90: ICD-10-CM

## 2017-06-05 DIAGNOSIS — Z09: Primary | ICD-10-CM

## 2017-06-05 PROCEDURE — 99211 OFF/OP EST MAY X REQ PHY/QHP: CPT

## 2017-06-05 NOTE — P.HPBAR
Bariatric H&P





- History & Physicial


H&P Date: 06/05/17


History & Physicial: 


Visit/CC: follow up visit





Patient initial contact: 





Initial weight: 91.762 kg


Initial weight in pounds: 202.30


Height: 5 ft 2 in


Initial BMI: 37.0





Last weight: 





Current weight: 81.193 kg


Current weight in pounds: 179.00


Current BMI: 32.7





Ideal body weight (based on NIH guidelines): 49.895 kg





Excess body weight loss: 25.2%








The patient is a 56 year-old F who presents for Bariatric Assessment.  Patient 

sleeve gastrectomy follow-up.  She lost 10 pounds since her last visit.  She's 

had some minimal GERD symptoms.  She denies any dysphagia.














Past Medical History


Past Medical History: Asthma, Fibromyalgia, GERD/Reflux, Musculoskeletal 

Disorder, Osteoarthritis (OA), Skin Disorder, Thyroid Disorder


Additional Past Medical History / Comment(s): IBS, MIGRAINES, HIATAL HERNIA, 

GOUT, ROSACEA, HERNIATED DISCS WITH BACK PAIN - [facility] PROCEDURES, LOW 

THYROID, COUGH DUE TO SINUS DRAINAGE .


History of Any Multi-Drug Resistant Organisms: None Reported


Past Surgical History: Adenoidectomy, Appendectomy, Bariatric Surgery, 

Cholecystectomy, Heart Catheterization, Orthopedic Surgery, Tonsillectomy, 

Tubal Ligation, Uterine Ablation


Additional Past Surgical History / Comment(s): tendon/ligament repair in foot, 

fredo knee arthroscopy, lipoma out of rt. shoulder, lap band insertion and  

removed, rt hand surgery,.


Past Anesthesia/Blood Transfusion Reactions: Motion Sickness


Past Psychological History: Anxiety, Bipolar, Depression


Additional Psychological History / Comment(s): Major depressive disorder,  

suicide attempts x 4,


Smoking Status: Never smoker


Past Alcohol Use History: None Reported


Past Drug Use History: None Reported





- Past Family History


  ** Mother


Family Medical History: Cancer


Additional Family Medical History / Comment(s): Pancreatic cancer





  ** Father


Family Medical History: Cancer, Deep Vein Thrombosis (DVT), Pulmonary Embolus


Additional Family Medical History / Comment(s): Prostate  cancer





Surgical - Exam


 Vital Signs











Temp Pulse BP


 


 97.8 F   71   118/56 


 


 06/05/17 13:57  06/05/17 13:57  06/05/17 13:57














- General


well developed, no distress





- Eyes


PERRL





- ENT


normal pinna





- Neck


no masses





- Respiratory


normal expansion





- Cardiovascular


Rhythm: regular





- Abdomen


Abdomen: soft, non tender





Bariatric Assessment & Plan


Plan: 





Status post sleeve gastrectomy.  Patient did well.  Her abdomen is soft.  Her 

GERD symptoms are minimal.  She'll follow-up in 4 weeks.





Bariatric Checklist


Checklist: 


Plan: 





Checklist: 





EGD: 


1. Hiatal hernia: 


2. H. Pylori: 





HgbA1c: 





Vitamin D: 





Smoking: Never smoker





Primary care physician referral: Delphine HerronAdam)





Psychiatry clearance: 





Cardiology clearance: 





Sleep study: 





Diet journal: 





VTE risk score: 





VTE risk level: 





Rehab needs at discharge:

## 2017-07-10 ENCOUNTER — HOSPITAL ENCOUNTER (OUTPATIENT)
Dept: HOSPITAL 47 - BARWHC3 | Age: 57
Discharge: HOME | End: 2017-07-10
Attending: SURGERY
Payer: MEDICARE

## 2017-07-10 VITALS
SYSTOLIC BLOOD PRESSURE: 115 MMHG | HEART RATE: 67 BPM | RESPIRATION RATE: 16 BRPM | DIASTOLIC BLOOD PRESSURE: 73 MMHG | TEMPERATURE: 97.8 F

## 2017-07-10 VITALS — BODY MASS INDEX: 31.6 KG/M2

## 2017-07-10 DIAGNOSIS — K21.9: ICD-10-CM

## 2017-07-10 DIAGNOSIS — Z98.84: ICD-10-CM

## 2017-07-10 DIAGNOSIS — Z48.815: Primary | ICD-10-CM

## 2017-07-10 PROCEDURE — 99211 OFF/OP EST MAY X REQ PHY/QHP: CPT

## 2017-07-10 NOTE — P.HPBAR
Bariatric H&P





- History & Physicial


H&P Date: 07/10/17


History & Physicial: 


Visit/CC: sleeve follow-up





Patient initial contact: 





Initial weight: 91.762 kg


Initial weight in pounds: 202.30


Height: 5 ft 2 in


Initial BMI: 37.0





Last weight: 179





Current weight: 78.585 kg


Current weight in pounds: 173.00


Current BMI: 31.6





Ideal body weight (based on NIH guidelines): 49.895 kg





Excess body weight loss: 31.7%








The patient is a 56 year-old F who presents for Bariatric Assessment.  Patient 

presents today for sleeve gastric follow-up.  She is doing quite well.  She has 

had minimal complaints of GERD.  Her to arthritis has improved.  She still has 

chronic back pain.














Past Medical History


Past Medical History: Asthma, Fibromyalgia, GERD/Reflux, Musculoskeletal 

Disorder, Osteoarthritis (OA), Skin Disorder, Thyroid Disorder


Additional Past Medical History / Comment(s): IBS, MIGRAINES, HIATAL HERNIA, 

GOUT, ROSACEA, HERNIATED DISCS WITH BACK PAIN - [facility] PROCEDURES, LOW 

THYROID, COUGH DUE TO SINUS DRAINAGE .


History of Any Multi-Drug Resistant Organisms: None Reported


Past Surgical History: Adenoidectomy, Appendectomy, Bariatric Surgery, 

Cholecystectomy, Heart Catheterization, Orthopedic Surgery, Tonsillectomy, 

Tubal Ligation, Uterine Ablation


Additional Past Surgical History / Comment(s): tendon/ligament repair in foot, 

fredo knee arthroscopy, lipoma out of rt. shoulder, lap band insertion and  

removed, rt hand surgery,.


Past Anesthesia/Blood Transfusion Reactions: Motion Sickness


Smoking Status: Never smoker





- Past Family History


  ** Mother


Family Medical History: Cancer


Additional Family Medical History / Comment(s): Pancreatic cancer





  ** Father


Family Medical History: Cancer, Deep Vein Thrombosis (DVT), Pulmonary Embolus


Additional Family Medical History / Comment(s): Prostate  cancer





Surgical - Exam


 Vital Signs











Temp Pulse Resp BP


 


 97.8 F   67   16   115/73 


 


 07/10/17 12:57  07/10/17 12:57  07/10/17 12:57  07/10/17 12:57














- General


well developed





- Abdomen


Abdomen: soft, non tender





Bariatric Assessment & Plan


Plan: 





Status post sleeve yesterday.  Patient's last additional 40 pounds.  She will 

follow-up in one month recheck.  Her GERD symptoms will be observed.





Bariatric Checklist


Checklist: 


Plan: 





Checklist: 





EGD: 


1. Hiatal hernia: 


2. H. Pylori: 





HgbA1c: 





Vitamin D: 





Smoking: Never smoker





Primary care physician referral: Delphine Lin ([facility])





Psychiatry clearance: 





Cardiology clearance: 





Sleep study: 





Diet journal: 





VTE risk score: 





VTE risk level: 





Rehab needs at discharge:

## 2017-08-28 ENCOUNTER — HOSPITAL ENCOUNTER (OUTPATIENT)
Dept: HOSPITAL 47 - BARWHC3 | Age: 57
Discharge: HOME | End: 2017-08-28
Attending: SURGERY
Payer: MEDICARE

## 2017-08-28 VITALS
TEMPERATURE: 98.4 F | HEART RATE: 79 BPM | DIASTOLIC BLOOD PRESSURE: 69 MMHG | SYSTOLIC BLOOD PRESSURE: 121 MMHG | RESPIRATION RATE: 16 BRPM

## 2017-08-28 VITALS — BODY MASS INDEX: 29.4 KG/M2

## 2017-08-28 DIAGNOSIS — Z09: Primary | ICD-10-CM

## 2017-08-28 DIAGNOSIS — K21.9: ICD-10-CM

## 2017-08-28 DIAGNOSIS — Z98.84: ICD-10-CM

## 2017-08-28 DIAGNOSIS — M79.7: ICD-10-CM

## 2017-08-28 PROCEDURE — 99211 OFF/OP EST MAY X REQ PHY/QHP: CPT

## 2017-08-28 PROCEDURE — 97803 MED NUTRITION INDIV SUBSEQ: CPT

## 2017-08-28 NOTE — P.HPBAR
Bariatric H&P





- History & Physicial


H&P Date: 08/28/17


History & Physicial: 


Visit/CC: yadiralanceadelaidatigrerosemarie





Patient initial contact: 





Initial weight: 91.762 kg


Initial weight in pounds: 202.30


Height: 5 ft 2 in


Initial BMI: 37.0





Last weight: 173





Current weight: 73.028 kg


Current weight in pounds: 161.00


Current BMI: 29.4





Ideal body weight (based on NIH guidelines): 49.895 kg





Excess body weight loss: 44.7%








The patient is a 56 year-old F who presents for Bariatric Assessment.  Patient 

presents today for seed gastric a fall.  She underwent radical hysterectomy 2 

weeks ago.  She lost approximately 12 pounds since her last visit.  She's had 

some minimal GERD.














Past Medical History


Past Medical History: Asthma, Fibromyalgia, GERD/Reflux, Musculoskeletal 

Disorder, Osteoarthritis (OA), Skin Disorder, Thyroid Disorder


Additional Past Medical History / Comment(s): IBS, MIGRAINES, HIATAL HERNIA, 

GOUT, ROSACEA, HERNIATED DISCS WITH BACK PAIN - [facility] PROCEDURES, LOW 

THYROID, COUGH DUE TO SINUS DRAINAGE .


History of Any Multi-Drug Resistant Organisms: None Reported


Past Surgical History: Adenoidectomy, Appendectomy, Bariatric Surgery, 

Cholecystectomy, Heart Catheterization, Orthopedic Surgery, Tonsillectomy, 

Tubal Ligation, Uterine Ablation


Additional Past Surgical History / Comment(s): tendon/ligament repair in foot, 

fredo knee arthroscopy, lipoma out of rt. shoulder, lap band insertion and  

removed, rt hand surgery,.


Past Anesthesia/Blood Transfusion Reactions: Motion Sickness


Smoking Status: Never smoker





- Past Family History


  ** Mother


Family Medical History: Cancer


Additional Family Medical History / Comment(s): Pancreatic cancer





  ** Father


Family Medical History: Cancer, Deep Vein Thrombosis (DVT), Pulmonary Embolus


Additional Family Medical History / Comment(s): Prostate  cancer





Surgical - Exam


 Vital Signs











Temp Pulse Resp BP


 


 98.4 F   79   16   121/69 


 


 08/28/17 14:27  08/28/17 14:27  08/28/17 14:27  08/28/17 14:27














- General


well developed, well nourished, no distress





- Eyes


PERRL





- Abdomen


Abdomen: soft, non tender





Bariatric Assessment & Plan


Plan: 





Status post sleeve yesterday.  Patient's GERD symptoms are minimal and will be 

observed.  She'll follow-up in 2 months.





Bariatric Checklist


Checklist: 


Plan: 





Checklist: 





EGD: 


1. Hiatal hernia: 


2. H. Pylori: 





HgbA1c: 





Vitamin D: 





Smoking: Never smoker





Primary care physician referral: Delphine HerronAdam)





Psychiatry clearance: 





Cardiology clearance: 





Sleep study: 





Diet journal: 





VTE risk score: 





VTE risk level: 





Rehab needs at discharge:

## 2018-02-12 ENCOUNTER — HOSPITAL ENCOUNTER (OUTPATIENT)
Dept: HOSPITAL 47 - BARWHC3 | Age: 58
Discharge: HOME | End: 2018-02-12
Attending: SURGERY
Payer: MEDICARE

## 2018-02-12 VITALS
RESPIRATION RATE: 16 BRPM | TEMPERATURE: 98.1 F | DIASTOLIC BLOOD PRESSURE: 82 MMHG | SYSTOLIC BLOOD PRESSURE: 120 MMHG | HEART RATE: 64 BPM

## 2018-02-12 VITALS — BODY MASS INDEX: 26.5 KG/M2

## 2018-02-12 DIAGNOSIS — D50.8: ICD-10-CM

## 2018-02-12 DIAGNOSIS — Z48.815: Primary | ICD-10-CM

## 2018-02-12 DIAGNOSIS — E66.01: ICD-10-CM

## 2018-02-12 DIAGNOSIS — E55.9: ICD-10-CM

## 2018-02-12 DIAGNOSIS — E44.0: ICD-10-CM

## 2018-02-12 DIAGNOSIS — Z98.84: ICD-10-CM

## 2018-02-12 LAB
ALBUMIN SERPL-MCNC: 4 G/DL (ref 3.5–5)
ALP SERPL-CCNC: 103 U/L (ref 38–126)
ALT SERPL-CCNC: 25 U/L (ref 9–52)
ANION GAP SERPL CALC-SCNC: 10 MMOL/L
AST SERPL-CCNC: 15 U/L (ref 14–36)
BUN SERPL-SCNC: 17 MG/DL (ref 7–17)
CALCIUM SPEC-MCNC: 9.5 MG/DL (ref 8.4–10.2)
CHLORIDE SERPL-SCNC: 103 MMOL/L (ref 98–107)
CO2 SERPL-SCNC: 27 MMOL/L (ref 22–30)
ERYTHROCYTE [DISTWIDTH] IN BLOOD BY AUTOMATED COUNT: 5.13 M/UL (ref 3.8–5.4)
ERYTHROCYTE [DISTWIDTH] IN BLOOD: 14.5 % (ref 11.5–15.5)
GLUCOSE SERPL-MCNC: 100 MG/DL (ref 74–99)
HBA1C MFR BLD: 5.2 % (ref 4–6)
HCT VFR BLD AUTO: 43.5 % (ref 34–46)
HGB BLD-MCNC: 13.2 GM/DL (ref 11.4–16)
MCH RBC QN AUTO: 25.8 PG (ref 25–35)
MCHC RBC AUTO-ENTMCNC: 30.4 G/DL (ref 31–37)
MCV RBC AUTO: 84.9 FL (ref 80–100)
PLATELET # BLD AUTO: 205 K/UL (ref 150–450)
POTASSIUM SERPL-SCNC: 4.1 MMOL/L (ref 3.5–5.1)
PROT SERPL-MCNC: 6.6 G/DL (ref 6.3–8.2)
SODIUM SERPL-SCNC: 140 MMOL/L (ref 137–145)
VIT B12 SERPL-MCNC: 376 PG/ML (ref 200–944)
WBC # BLD AUTO: 7.7 K/UL (ref 3.8–10.6)

## 2018-02-12 PROCEDURE — 85027 COMPLETE CBC AUTOMATED: CPT

## 2018-02-12 PROCEDURE — 99211 OFF/OP EST MAY X REQ PHY/QHP: CPT

## 2018-02-12 PROCEDURE — 97803 MED NUTRITION INDIV SUBSEQ: CPT

## 2018-02-12 PROCEDURE — 80053 COMPREHEN METABOLIC PANEL: CPT

## 2018-02-12 PROCEDURE — 83036 HEMOGLOBIN GLYCOSYLATED A1C: CPT

## 2018-02-12 PROCEDURE — 84425 ASSAY OF VITAMIN B-1: CPT

## 2018-02-12 PROCEDURE — 82728 ASSAY OF FERRITIN: CPT

## 2018-02-12 PROCEDURE — 83540 ASSAY OF IRON: CPT

## 2018-02-12 PROCEDURE — 82607 VITAMIN B-12: CPT

## 2018-02-12 PROCEDURE — 84443 ASSAY THYROID STIM HORMONE: CPT

## 2018-02-12 PROCEDURE — 82306 VITAMIN D 25 HYDROXY: CPT

## 2018-02-12 PROCEDURE — 83550 IRON BINDING TEST: CPT

## 2018-02-12 NOTE — P.HPBAR
Bariatric H&P





- History & Physicial


H&P Date: 02/12/18


History & Physicial: 


Visit/CC: sleeve follow-up





Patient initial contact: 





Initial weight: 91.762 kg


Initial weight in pounds: 202.30


Height: 5 ft 2 in


Initial BMI: 37.0





Last weight: 





Current weight: 65.771 kg


Current weight in pounds: 145.00


Current BMI: 26.5





Ideal body weight (based on NIH guidelines): 49.895 kg





Excess body weight loss: 62.0%








The patient is a 57 year-old F who presents for Bariatric Assessment.  The 

patient presents today for sleeve gastric fall.  She also 12 pounds since her 

last visit.  She has some minimal GERD.














Past Medical History


Past Medical History: Asthma, Fibromyalgia, GERD/Reflux, Musculoskeletal 

Disorder, Osteoarthritis (OA), Skin Disorder, Thyroid Disorder


Additional Past Medical History / Comment(s): IBS, MIGRAINES, HIATAL HERNIA, 

GOUT, ROSACEA, HERNIATED DISCS WITH BACK PAIN - [facility] PROCEDURES, LOW 

THYROID, COUGH DUE TO SINUS DRAINAGE .


History of Any Multi-Drug Resistant Organisms: None Reported


Past Surgical History: Adenoidectomy, Appendectomy, Bariatric Surgery, 

Cholecystectomy, Heart Catheterization, Orthopedic Surgery, Tonsillectomy, 

Tubal Ligation, Uterine Ablation


Additional Past Surgical History / Comment(s): tendon/ligament repair in foot, 

fredo knee arthroscopy, lipoma out of rt. shoulder, lap band insertion and  

removed, rt hand surgery,.


Past Anesthesia/Blood Transfusion Reactions: Motion Sickness


Smoking Status: Never smoker





- Past Family History


  ** Mother


Family Medical History: Cancer


Additional Family Medical History / Comment(s): Pancreatic cancer





  ** Father


Family Medical History: Cancer, Deep Vein Thrombosis (DVT), Pulmonary Embolus


Additional Family Medical History / Comment(s): Prostate  cancer





Surgical - Exam


 Vital Signs











Temp Pulse Resp BP


 


 98.1 F   64   16   120/82 


 


 02/12/18 14:22  02/12/18 14:22  02/12/18 14:22  02/12/18 14:22














- General


well developed, well nourished, no distress





- Eyes


PERRL





- ENT


normal pinna





- Cardiovascular


Rhythm: regular





- Abdomen


Abdomen: soft, non tender





Results





- Labs





 02/12/18 13:57





 02/12/18 13:57


 Abnormal Lab Results - Last 24 Hours (Table)











  02/12/18 02/12/18 Range/Units





  13:57 13:57 


 


MCHC  30.4 L   (31.0-37.0)  g/dL


 


Glucose   100 H  (74-99)  mg/dL








 Diabetes panel











  02/12/18 Range/Units





  13:57 


 


Sodium  140  (137-145)  mmol/L


 


Potassium  4.1  (3.5-5.1)  mmol/L


 


Chloride  103  ()  mmol/L


 


Carbon Dioxide  27  (22-30)  mmol/L


 


BUN  17  (7-17)  mg/dL


 


Creatinine  0.88  (0.52-1.04)  mg/dL


 


Glucose  100 H  (74-99)  mg/dL


 


Calcium  9.5  (8.4-10.2)  mg/dL


 


AST  15  (14-36)  U/L


 


ALT  25  (9-52)  U/L


 


Alkaline Phosphatase  103  ()  U/L


 


Total Protein  6.6  (6.3-8.2)  g/dL


 


Albumin  4.0  (3.5-5.0)  g/dL








 Thyroid panel











  02/12/18 Range/Units





  13:57 


 


TSH  0.983  (0.465-4.680)  mIU/L








 Calcium panel











  02/12/18 Range/Units





  13:57 


 


Calcium  9.5  (8.4-10.2)  mg/dL


 


Albumin  4.0  (3.5-5.0)  g/dL








 Pituitary panel











  02/12/18 Range/Units





  13:57 


 


Sodium  140  (137-145)  mmol/L


 


Potassium  4.1  (3.5-5.1)  mmol/L


 


Chloride  103  ()  mmol/L


 


Carbon Dioxide  27  (22-30)  mmol/L


 


BUN  17  (7-17)  mg/dL


 


Creatinine  0.88  (0.52-1.04)  mg/dL


 


Glucose  100 H  (74-99)  mg/dL


 


Calcium  9.5  (8.4-10.2)  mg/dL


 


TSH  0.983  (0.465-4.680)  mIU/L








 Adrenal panel











  02/12/18 Range/Units





  13:57 


 


Sodium  140  (137-145)  mmol/L


 


Potassium  4.1  (3.5-5.1)  mmol/L


 


Chloride  103  ()  mmol/L


 


Carbon Dioxide  27  (22-30)  mmol/L


 


BUN  17  (7-17)  mg/dL


 


Creatinine  0.88  (0.52-1.04)  mg/dL


 


Glucose  100 H  (74-99)  mg/dL


 


Calcium  9.5  (8.4-10.2)  mg/dL


 


Total Bilirubin  0.3  (0.2-1.3)  mg/dL


 


AST  15  (14-36)  U/L


 


ALT  25  (9-52)  U/L


 


Alkaline Phosphatase  103  ()  U/L


 


Total Protein  6.6  (6.3-8.2)  g/dL


 


Albumin  4.0  (3.5-5.0)  g/dL














Bariatric Assessment & Plan


Plan: 





Status post sleeve gastric and.  Patient's had excellent weight loss.  She has 

minimal GERD which will be observed.  She'll follow-up in 12 weeks.





Bariatric Checklist


Checklist: 


Plan: 





Checklist: 





EGD: 


1. Hiatal hernia: 


2. H. Pylori: 





HgbA1c: 





Vitamin D: 





Smoking: Never smoker





Primary care physician referral: Delphine Lin ([facility])





Psychiatry clearance: 





Cardiology clearance: 





Sleep study: 





Diet journal: 





VTE risk score: 





VTE risk level: 





Rehab needs at discharge:

## 2018-07-09 ENCOUNTER — HOSPITAL ENCOUNTER (OUTPATIENT)
Dept: HOSPITAL 47 - BARWHC3 | Age: 58
Discharge: HOME | End: 2018-07-09
Attending: SURGERY
Payer: COMMERCIAL

## 2018-07-09 VITALS — DIASTOLIC BLOOD PRESSURE: 65 MMHG | HEART RATE: 71 BPM | SYSTOLIC BLOOD PRESSURE: 104 MMHG | TEMPERATURE: 98.2 F

## 2018-07-09 VITALS — BODY MASS INDEX: 27.1 KG/M2

## 2018-07-09 DIAGNOSIS — E44.0: ICD-10-CM

## 2018-07-09 DIAGNOSIS — Z48.815: Primary | ICD-10-CM

## 2018-07-09 DIAGNOSIS — Z98.84: ICD-10-CM

## 2018-07-09 DIAGNOSIS — E55.9: ICD-10-CM

## 2018-07-09 LAB
ALBUMIN SERPL-MCNC: 3.9 G/DL (ref 3.5–5)
ALP SERPL-CCNC: 79 U/L (ref 38–126)
ALT SERPL-CCNC: 27 U/L (ref 9–52)
ANION GAP SERPL CALC-SCNC: 10 MMOL/L
AST SERPL-CCNC: 17 U/L (ref 14–36)
BUN SERPL-SCNC: 13 MG/DL (ref 7–17)
CALCIUM SPEC-MCNC: 9.5 MG/DL (ref 8.4–10.2)
CHLORIDE SERPL-SCNC: 102 MMOL/L (ref 98–107)
CO2 SERPL-SCNC: 28 MMOL/L (ref 22–30)
ERYTHROCYTE [DISTWIDTH] IN BLOOD BY AUTOMATED COUNT: 4.82 M/UL (ref 3.8–5.4)
ERYTHROCYTE [DISTWIDTH] IN BLOOD: 13.8 % (ref 11.5–15.5)
GLUCOSE SERPL-MCNC: 89 MG/DL (ref 74–99)
HCT VFR BLD AUTO: 40.5 % (ref 34–46)
HGB BLD-MCNC: 13.2 GM/DL (ref 11.4–16)
MCH RBC QN AUTO: 27.4 PG (ref 25–35)
MCHC RBC AUTO-ENTMCNC: 32.6 G/DL (ref 31–37)
MCV RBC AUTO: 84.1 FL (ref 80–100)
PLATELET # BLD AUTO: 188 K/UL (ref 150–450)
POTASSIUM SERPL-SCNC: 4.6 MMOL/L (ref 3.5–5.1)
PROT SERPL-MCNC: 6.3 G/DL (ref 6.3–8.2)
SODIUM SERPL-SCNC: 140 MMOL/L (ref 137–145)
WBC # BLD AUTO: 6 K/UL (ref 3.8–10.6)

## 2018-07-09 PROCEDURE — 80053 COMPREHEN METABOLIC PANEL: CPT

## 2018-07-09 PROCEDURE — 99211 OFF/OP EST MAY X REQ PHY/QHP: CPT

## 2018-07-09 PROCEDURE — 84134 ASSAY OF PREALBUMIN: CPT

## 2018-07-09 PROCEDURE — 82306 VITAMIN D 25 HYDROXY: CPT

## 2018-07-09 PROCEDURE — 82746 ASSAY OF FOLIC ACID SERUM: CPT

## 2018-07-09 PROCEDURE — 85027 COMPLETE CBC AUTOMATED: CPT

## 2018-07-09 PROCEDURE — 84443 ASSAY THYROID STIM HORMONE: CPT

## 2022-05-13 NOTE — P.HPBAR
Bariatric H&P





- History & Physicial


H&P Date: 07/09/18


History & Physicial: 


Visit/CC: postop follow up





Patient initial contact: 





Initial weight: 91.762 kg


Initial weight in pounds: 202.30


Height: 5 ft 2 in


Initial BMI: 37.0





Last weight: 





Current weight: 67.449 kg


Current weight in pounds: 148.70


Current BMI: 27.1





Ideal body weight (based on NIH guidelines): 49.895 kg





Excess body weight loss: 58.0%








The patient is a 57 year-old F who presents for Bariatric Assessment.  Patient 

presents today for sleeve gastrectomy fall.  She is maintaining her weight.  She

's had some mild GERD.














Past Medical History


Past Medical History: Asthma, Fibromyalgia, GERD/Reflux, Musculoskeletal 

Disorder, Osteoarthritis (OA), Skin Disorder, Thyroid Disorder


Additional Past Medical History / Comment(s): IBS, MIGRAINES, HIATAL HERNIA, 

GOUT, ROSACEA, HERNIATED DISCS WITH BACK PAIN - [facility] PROCEDURES, LOW 

THYROID, COUGH DUE TO SINUS DRAINAGE .


History of Any Multi-Drug Resistant Organisms: None Reported


Past Surgical History: Adenoidectomy, Appendectomy, Bariatric Surgery, 

Cholecystectomy, Heart Catheterization, Orthopedic Surgery, Tonsillectomy, 

Tubal Ligation, Uterine Ablation


Additional Past Surgical History / Comment(s): tendon/ligament repair in foot, 

fredo knee arthroscopy, lipoma out of rt. shoulder, lap band insertion and  

removed, rt hand surgery,.


Past Anesthesia/Blood Transfusion Reactions: Motion Sickness


Past Psychological History: Anxiety, Bipolar, Depression


Additional Psychological History / Comment(s): Major depressive disorder,  

suicide attempts x 4,


Smoking Status: Never smoker


Past Alcohol Use History: None Reported


Past Drug Use History: None Reported





- Past Family History


  ** Mother


Family Medical History: Cancer


Additional Family Medical History / Comment(s): Pancreatic cancer





  ** Father


Family Medical History: Cancer, Deep Vein Thrombosis (DVT), Pulmonary Embolus


Additional Family Medical History / Comment(s): Prostate  cancer





Surgical - Exam


 Vital Signs











Temp Pulse BP


 


 98.2 F   71   104/65 


 


 07/09/18 13:36  07/09/18 13:36  07/09/18 13:36














- General


well developed, no distress





- Eyes


PERRL





- ENT


normal nares





- Neck


no masses





- Respiratory


normal expansion





- Cardiovascular


Rhythm: regular





- Abdomen


Abdomen: soft, non tender





Results





- Labs





 07/09/18 13:41





 07/09/18 13:41


 Diabetes panel











  07/09/18 Range/Units





  13:41 


 


Sodium  140  (137-145)  mmol/L


 


Potassium  4.6  (3.5-5.1)  mmol/L


 


Chloride  102  ()  mmol/L


 


Carbon Dioxide  28  (22-30)  mmol/L


 


BUN  13  (7-17)  mg/dL


 


Creatinine  0.81  (0.52-1.04)  mg/dL


 


Glucose  89  (74-99)  mg/dL


 


Calcium  9.5  (8.4-10.2)  mg/dL


 


AST  17  (14-36)  U/L


 


ALT  27  (9-52)  U/L


 


Alkaline Phosphatase  79  ()  U/L


 


Total Protein  6.3  (6.3-8.2)  g/dL


 


Albumin  3.9  (3.5-5.0)  g/dL








 Thyroid panel











  07/09/18 Range/Units





  13:41 


 


TSH  1.030  (0.465-4.680)  mIU/L








 Calcium panel











  07/09/18 Range/Units





  13:41 


 


Calcium  9.5  (8.4-10.2)  mg/dL


 


Albumin  3.9  (3.5-5.0)  g/dL








 Pituitary panel











  07/09/18 Range/Units





  13:41 


 


Sodium  140  (137-145)  mmol/L


 


Potassium  4.6  (3.5-5.1)  mmol/L


 


Chloride  102  ()  mmol/L


 


Carbon Dioxide  28  (22-30)  mmol/L


 


BUN  13  (7-17)  mg/dL


 


Creatinine  0.81  (0.52-1.04)  mg/dL


 


Glucose  89  (74-99)  mg/dL


 


Calcium  9.5  (8.4-10.2)  mg/dL


 


TSH  1.030  (0.465-4.680)  mIU/L








 Adrenal panel











  07/09/18 Range/Units





  13:41 


 


Sodium  140  (137-145)  mmol/L


 


Potassium  4.6  (3.5-5.1)  mmol/L


 


Chloride  102  ()  mmol/L


 


Carbon Dioxide  28  (22-30)  mmol/L


 


BUN  13  (7-17)  mg/dL


 


Creatinine  0.81  (0.52-1.04)  mg/dL


 


Glucose  89  (74-99)  mg/dL


 


Calcium  9.5  (8.4-10.2)  mg/dL


 


Total Bilirubin  0.3  (0.2-1.3)  mg/dL


 


AST  17  (14-36)  U/L


 


ALT  27  (9-52)  U/L


 


Alkaline Phosphatase  79  ()  U/L


 


Total Protein  6.3  (6.3-8.2)  g/dL


 


Albumin  3.9  (3.5-5.0)  g/dL














Bariatric Assessment & Plan


Plan: 





Status post sleeve gastrectomy.  Patient's GERD is minimal we observed.  She'll 

follow-up in 6 months.





Bariatric Checklist


Checklist: 


Plan: 





Checklist: 





EGD: 


1. Hiatal hernia: 


2. H. Pylori: 





HgbA1c: 





Vitamin D: 





Smoking: Never smoker





Primary care physician referral: Delphine Humphries)





Psychiatry clearance: 





Cardiology clearance: 





Sleep study: 





Diet journal: 





VTE risk score: 





VTE risk level: 





Rehab needs at discharge: no